# Patient Record
Sex: FEMALE | Race: WHITE | HISPANIC OR LATINO
[De-identification: names, ages, dates, MRNs, and addresses within clinical notes are randomized per-mention and may not be internally consistent; named-entity substitution may affect disease eponyms.]

---

## 2017-07-12 ENCOUNTER — HOSPITAL (OUTPATIENT)
Dept: OTHER | Age: 22
End: 2017-07-12
Attending: OBSTETRICS & GYNECOLOGY

## 2017-07-12 LAB
ANALYZER ANC (IANC): ABNORMAL
ERYTHROCYTE [DISTWIDTH] IN BLOOD: 13.7 % (ref 11–15)
HEMATOCRIT: 39.3 % (ref 36–46.5)
HGB BLD-MCNC: 12.9 GM/DL (ref 12–15.5)
MCH RBC QN AUTO: 27.6 PG (ref 26–34)
MCHC RBC AUTO-ENTMCNC: 32.8 GM/DL (ref 32–36.5)
MCV RBC AUTO: 84 FL (ref 78–100)
PLATELET # BLD: 254 THOUSAND/MCL (ref 140–450)
RBC # BLD: 4.68 MILLION/MCL (ref 4–5.2)
WBC # BLD: 13.1 THOUSAND/MCL (ref 4.2–11)

## 2017-07-14 ENCOUNTER — HOSPITAL (OUTPATIENT)
Dept: OTHER | Age: 22
End: 2017-07-14
Attending: OBSTETRICS & GYNECOLOGY

## 2017-07-14 ENCOUNTER — CHARTING TRANS (OUTPATIENT)
Dept: OTHER | Age: 22
End: 2017-07-14

## 2017-07-14 LAB
ANALYZER ANC (IANC): ABNORMAL
ANALYZER ANC (IANC): ABNORMAL
APTT PPP: 27 SECONDS (ref 22–30)
APTT PPP: 29 SECONDS (ref 22–30)
APTT PPP: NORMAL S
APTT PPP: NORMAL S
BASE DEFICIT BLDCOA-SCNC: 6 MMOL/L
BASE DEFICIT BLDCOV-SCNC: 5 MMOL/L
BASE EXCESS BLDCOA CALC-SCNC: ABNORMAL MMOL/L
BASE EXCESS-RC: NORMAL
BASOPHILS # BLD: 0 THOUSAND/MCL (ref 0–0.3)
BASOPHILS # BLD: 0 THOUSAND/MCL (ref 0–0.3)
BASOPHILS NFR BLD: 0 %
BASOPHILS NFR BLD: 0 %
DIFFERENTIAL METHOD BLD: ABNORMAL
DIFFERENTIAL METHOD BLD: ABNORMAL
EOSINOPHIL # BLD: 0 THOUSAND/MCL (ref 0.1–0.5)
EOSINOPHIL # BLD: 0.1 THOUSAND/MCL (ref 0.1–0.5)
EOSINOPHIL NFR BLD: 0 %
EOSINOPHIL NFR BLD: 1 %
ERYTHROCYTE [DISTWIDTH] IN BLOOD: 13.6 % (ref 11–15)
ERYTHROCYTE [DISTWIDTH] IN BLOOD: 13.8 % (ref 11–15)
FIBRINOGEN RESULT: 390 MG/DL (ref 190–425)
FIBRINOGEN RESULT: 488 MG/DL (ref 190–425)
HCO3 BLDCOA-SCNC: 26 MMOL/L (ref 21–28)
HCO3 BLDCOV-SCNC: 22 MMOL/L (ref 22–29)
HEMATOCRIT: 33.6 % (ref 36–46.5)
HEMATOCRIT: 35.5 % (ref 36–46.5)
HGB BLD-MCNC: 11.3 GM/DL (ref 12–15.5)
HGB BLD-MCNC: 12 GM/DL (ref 12–15.5)
INR PPP: 0.9
INR PPP: 0.9
LYMPHOCYTES # BLD: 1.5 THOUSAND/MCL (ref 1.2–5.2)
LYMPHOCYTES # BLD: 2.1 THOUSAND/MCL (ref 1.2–5.2)
LYMPHOCYTES NFR BLD: 11 %
LYMPHOCYTES NFR BLD: 7 %
MCH RBC QN AUTO: 27.7 PG (ref 26–34)
MCH RBC QN AUTO: 27.9 PG (ref 26–34)
MCHC RBC AUTO-ENTMCNC: 33.6 GM/DL (ref 32–36.5)
MCHC RBC AUTO-ENTMCNC: 33.8 GM/DL (ref 32–36.5)
MCV RBC AUTO: 82.4 FL (ref 78–100)
MCV RBC AUTO: 82.6 FL (ref 78–100)
MONOCYTES # BLD: 0.5 THOUSAND/MCL (ref 0.3–0.9)
MONOCYTES # BLD: 0.8 THOUSAND/MCL (ref 0.3–0.9)
MONOCYTES NFR BLD: 2 %
MONOCYTES NFR BLD: 4 %
NEUTROPHILS # BLD: 16.4 THOUSAND/MCL (ref 1.8–8)
NEUTROPHILS # BLD: 18.4 THOUSAND/MCL (ref 1.8–8)
NEUTROPHILS NFR BLD: 86 %
NEUTROPHILS NFR BLD: 89 %
NEUTS SEG NFR BLD: ABNORMAL %
NEUTS SEG NFR BLD: ABNORMAL %
PCO2 BLDCOA: 83 MM HG (ref 31–74)
PCO2 BLDCOV: 49 MM HG (ref 23–49)
PERCENT NRBC: ABNORMAL
PERCENT NRBC: ABNORMAL
PH BLDCOA: 7.1 UNIT (ref 7.18–7.38)
PH BLDCOV: 7.26 UNIT (ref 7.25–7.45)
PLATELET # BLD: 258 THOUSAND/MCL (ref 140–450)
PLATELET # BLD: 276 THOUSAND/MCL (ref 140–450)
PO2 BLDCOV: 21 MM HG (ref 17–41)
PO2 RC ARTERIAL CORD (RACPO): <20 MM HG (ref 6–31)
PROTHROMBIN TIME: 10.2 SECONDS (ref 9.7–11.8)
PROTHROMBIN TIME: 10.4 SECONDS (ref 9.7–11.8)
PROTHROMBIN TIME: NORMAL
PROTHROMBIN TIME: NORMAL
RBC # BLD: 4.08 MILLION/MCL (ref 4–5.2)
RBC # BLD: 4.3 MILLION/MCL (ref 4–5.2)
WBC # BLD: 19.1 THOUSAND/MCL (ref 4.2–11)
WBC # BLD: 20.7 THOUSAND/MCL (ref 4.2–11)

## 2017-07-15 LAB
ANALYZER ANC (IANC): ABNORMAL
ANALYZER ANC (IANC): ABNORMAL
BASOPHILS # BLD: 0 THOUSAND/MCL (ref 0–0.3)
BASOPHILS NFR BLD: 0 %
DIFFERENTIAL METHOD BLD: ABNORMAL
EOSINOPHIL # BLD: 0.1 THOUSAND/MCL (ref 0.1–0.5)
EOSINOPHIL NFR BLD: 1 %
ERYTHROCYTE [DISTWIDTH] IN BLOOD: 13.8 % (ref 11–15)
ERYTHROCYTE [DISTWIDTH] IN BLOOD: 13.9 % (ref 11–15)
HEMATOCRIT: 25.2 % (ref 36–46.5)
HEMATOCRIT: 27.1 % (ref 36–46.5)
HGB BLD-MCNC: 8.5 GM/DL (ref 12–15.5)
HGB BLD-MCNC: 8.8 GM/DL (ref 12–15.5)
LYMPHOCYTES # BLD: 1.6 THOUSAND/MCL (ref 1.2–5.2)
LYMPHOCYTES NFR BLD: 14 %
MCH RBC QN AUTO: 27.3 PG (ref 26–34)
MCH RBC QN AUTO: 28 PG (ref 26–34)
MCHC RBC AUTO-ENTMCNC: 32.5 GM/DL (ref 32–36.5)
MCHC RBC AUTO-ENTMCNC: 33.7 GM/DL (ref 32–36.5)
MCV RBC AUTO: 82.9 FL (ref 78–100)
MCV RBC AUTO: 84.2 FL (ref 78–100)
MONOCYTES # BLD: 0.6 THOUSAND/MCL (ref 0.3–0.9)
MONOCYTES NFR BLD: 5 %
NEUTROPHILS # BLD: 9.4 THOUSAND/MCL (ref 1.8–8)
NEUTROPHILS NFR BLD: 80 %
NEUTS SEG NFR BLD: ABNORMAL %
PERCENT NRBC: ABNORMAL
PLATELET # BLD: 196 THOUSAND/MCL (ref 140–450)
PLATELET # BLD: 248 THOUSAND/MCL (ref 140–450)
RBC # BLD: 3.04 MILLION/MCL (ref 4–5.2)
RBC # BLD: 3.22 MILLION/MCL (ref 4–5.2)
WBC # BLD: 11.6 THOUSAND/MCL (ref 4.2–11)
WBC # BLD: 13.1 THOUSAND/MCL (ref 4.2–11)

## 2017-07-17 ENCOUNTER — DIAGNOSTIC TRANS (OUTPATIENT)
Dept: OTHER | Age: 22
End: 2017-07-17

## 2019-12-10 ENCOUNTER — HOSPITAL (OUTPATIENT)
Dept: OTHER | Age: 24
End: 2019-12-10

## 2019-12-10 ENCOUNTER — DIAGNOSTIC TRANS (OUTPATIENT)
Dept: OTHER | Age: 24
End: 2019-12-10

## 2019-12-10 LAB
ANALYZER ANC (IANC): NORMAL
ANION GAP SERPL CALC-SCNC: 11 MMOL/L (ref 10–20)
BASOPHILS # BLD: 0.1 K/MCL (ref 0–0.3)
BASOPHILS NFR BLD: 1 %
BUN SERPL-MCNC: 9 MG/DL (ref 6–20)
BUN/CREAT SERPL: 14 (ref 7–25)
CALCIUM SERPL-MCNC: 9.2 MG/DL (ref 8.4–10.2)
CHLORIDE SERPL-SCNC: 104 MMOL/L (ref 98–107)
CO2 SERPL-SCNC: 28 MMOL/L (ref 21–32)
CREAT SERPL-MCNC: 0.66 MG/DL (ref 0.51–0.95)
DIFFERENTIAL METHOD BLD: NORMAL
EOSINOPHIL # BLD: 0.2 K/MCL (ref 0.1–0.5)
EOSINOPHIL NFR BLD: 2 %
ERYTHROCYTE [DISTWIDTH] IN BLOOD: 12.7 % (ref 11–15)
GLUCOSE SERPL-MCNC: 104 MG/DL (ref 65–99)
HCG POINT OF CARE (5HGRST): NEGATIVE
HCT VFR BLD CALC: 42.5 % (ref 36–46.5)
HGB BLD-MCNC: 14.2 G/DL (ref 12–15.5)
IMM GRANULOCYTES # BLD AUTO: 0.1 K/MCL (ref 0–0.2)
IMM GRANULOCYTES NFR BLD: 1 %
LYMPHOCYTES # BLD: 2.5 K/MCL (ref 1–4.8)
LYMPHOCYTES NFR BLD: 27 %
MAGNESIUM SERPL-MCNC: 2 MG/DL (ref 1.7–2.4)
MCH RBC QN AUTO: 28 PG (ref 26–34)
MCHC RBC AUTO-ENTMCNC: 33.4 G/DL (ref 32–36.5)
MCV RBC AUTO: 83.7 FL (ref 78–100)
MONOCYTES # BLD: 0.4 K/MCL (ref 0.3–0.9)
MONOCYTES NFR BLD: 4 %
NEUTROPHILS # BLD: 6 K/MCL (ref 1.8–7.7)
NEUTROPHILS NFR BLD: 65 %
NEUTS SEG NFR BLD: NORMAL %
NRBC (NRBCRE): 0 /100 WBC
NT-PROBNP SERPL-MCNC: 12 PG/ML
PLATELET # BLD: 344 K/MCL (ref 140–450)
POTASSIUM SERPL-SCNC: 4.2 MMOL/L (ref 3.4–5.1)
RBC # BLD: 5.08 MIL/MCL (ref 4–5.2)
SODIUM SERPL-SCNC: 139 MMOL/L (ref 135–145)
TROPONIN I SERPL HS-MCNC: <0.02 NG/ML
TROPONIN I SERPL HS-MCNC: <0.02 NG/ML
TSH SERPL-ACNC: 1.54 MCUNITS/ML (ref 0.35–5)
WBC # BLD: 9.2 K/MCL (ref 4.2–11)

## 2019-12-10 PROCEDURE — 99285 EMERGENCY DEPT VISIT HI MDM: CPT | Performed by: EMERGENCY MEDICINE

## 2019-12-10 PROCEDURE — 93010 ELECTROCARDIOGRAM REPORT: CPT | Performed by: INTERNAL MEDICINE

## 2020-03-11 ENCOUNTER — TELEPHONE (OUTPATIENT)
Dept: SCHEDULING | Age: 25
End: 2020-03-11

## 2024-11-04 ENCOUNTER — INITIAL PRENATAL (OUTPATIENT)
Age: 29
End: 2024-11-04
Payer: COMMERCIAL

## 2024-11-04 VITALS
BODY MASS INDEX: 40.77 KG/M2 | HEIGHT: 68 IN | DIASTOLIC BLOOD PRESSURE: 84 MMHG | SYSTOLIC BLOOD PRESSURE: 126 MMHG | WEIGHT: 269 LBS

## 2024-11-04 DIAGNOSIS — Z3A.01 6 WEEKS GESTATION OF PREGNANCY: Primary | ICD-10-CM

## 2024-11-04 DIAGNOSIS — O09.299 HISTORY OF GESTATIONAL DIABETES IN PRIOR PREGNANCY, CURRENTLY PREGNANT: ICD-10-CM

## 2024-11-04 DIAGNOSIS — O21.0 MORNING SICKNESS: ICD-10-CM

## 2024-11-04 DIAGNOSIS — O34.219 HISTORY OF CESAREAN DELIVERY AFFECTING PREGNANCY: ICD-10-CM

## 2024-11-04 DIAGNOSIS — Z86.32 HISTORY OF GESTATIONAL DIABETES IN PRIOR PREGNANCY, CURRENTLY PREGNANT: ICD-10-CM

## 2024-11-04 DIAGNOSIS — Z34.81 MULTIGRAVIDA IN FIRST TRIMESTER: ICD-10-CM

## 2024-11-04 PROCEDURE — 0501F PRENATAL FLOW SHEET: CPT

## 2024-11-04 RX ORDER — PRENATAL VIT NO.126/IRON/FOLIC 28MG-0.8MG
1 TABLET ORAL DAILY
COMMUNITY

## 2024-11-04 NOTE — PROGRESS NOTES
29-year old patient arrived to initiate prenatal care.     HPI: . No LMP recorded. Patient is pregnant.  Pregnancy was somewhat surprising but not prevented. Pre-pregnancy weight of 259.    Previous prenatal history significant for: 2 c-sections, GDM both pregnancies (not diagnosed until after the birth with first baby), macrosomia. First  for breech, second  for oligohydramnios while in labor   History significant for: healthy    The following portion of the patient's history were reviewed and updated as needed: allergies, current medications, past family history, past medical history, social history, surgical history, and problem list.    ROS: All systems reviewed and are negative with exception of the following: amenorrhea, nausea, fatigue      US ordered today, reviewed and shows IUP of 6w3d gestation and Estimated Date of Delivery: 25    Pap Smear : wnl    Exam:  Wt: 269 lb for TWG of 4.536 kg (10 lb), B/P 126/84, FHTs 133   General Appearance:  healthy-appearing . Appropriate mood and behavior.  HEENT:  Neck supple, no thyroidmegaly.  Cardiorespiratory: HR str and reg. No murmur. Lungs clear. Resp even and unlabored.  Abd: Soft, nontender. No CVA tenderness.   Ext: Calves non-tender. No cyanosis or edema.    Diagnoses and all orders for this visit:    1. 6 weeks gestation of pregnancy (Primary)  -     ABO / Rh  -     Ambulatory Referral to [facility]/Perinatology  -     Antibody Screen  -     CBC & Differential  -     Chlamydia trachomatis, Neisseria gonorrhoeae, PCR w/ confirmation - Urine, Urine, Clean Catch  -     ToxASSURE Select 13 (MW) - Urine, Clean Catch  -     HCV Antibody Rfx To Qnt PCR  -     Varicella Zoster Antibody, IgG  -     Urine Culture - , Urine, Clean Catch  -     Rubella Antibody, IgG  -     RPR, Rfx Qn RPR / Confirm TP  -     HIV-1 / O / 2 Ag / Antibody  -     Hepatitis B Surface Antigen    2. Multigravida in first trimester  Reviewed information in new  OB packet, including OTC medications for use during pregnancy, first trimester of pregnancy and discomforts, regular OB routine, ffDNA/chromosomal risk and maternal carrier screening testing.  Advised to maintain regular activity and/or exercise. Discussed bleeding and pelvic pain warnings and other signs to report. Discussed and ordered initial prenatal labs today. First Trimester of Pregnancy video included in AVS.    3. History of  delivery affecting pregnancy  Desires a     4. History of gestational diabetes in prior pregnancy, currently pregnant  -     Hemoglobin A1c    5. Morning sickness  Discussed nausea relief measures both nonpharmacologic (small frequent meals or snacks, avoiding triggers, aromatherapy, ginger, hard and/or sour candies or Preggie Pops, ginger, accu-pressure wrist bands, citrus/citric acid) and pharmacologic (both OTC and Rx). Encouraged adequate hydration and intake of calories. Patient to notify provider if symptoms persist or worsen Morning Sickness and Doxylamine; Vitamin B6 Delayed- and Extended-Release Tablets education included in the AVS.            Return to the office in 4 weeks for routine follow up and as needed with concerns.    This note has been signed electronically.   Sydney OLIVARES

## 2024-11-12 LAB
ABO GROUP BLD: NORMAL
BACTERIA UR CULT: NORMAL
BACTERIA UR CULT: NORMAL
BASOPHILS # BLD AUTO: 0.1 X10E3/UL (ref 0–0.2)
BASOPHILS NFR BLD AUTO: 1 %
BLD GP AB SCN SERPL QL: NEGATIVE
C TRACH RRNA SPEC QL NAA+PROBE: NEGATIVE
DRUGS UR: NORMAL
EOSINOPHIL # BLD AUTO: 0.2 X10E3/UL (ref 0–0.4)
EOSINOPHIL NFR BLD AUTO: 2 %
ERYTHROCYTE [DISTWIDTH] IN BLOOD BY AUTOMATED COUNT: 12.9 % (ref 11.7–15.4)
HBA1C MFR BLD: 6 % (ref 4.8–5.6)
HBV SURFACE AG SERPL QL IA: NEGATIVE
HCT VFR BLD AUTO: 43.7 % (ref 34–46.6)
HCV AB SERPL QL IA: NORMAL
HCV IGG SERPL QL IA: NON REACTIVE
HGB BLD-MCNC: 13.8 G/DL (ref 11.1–15.9)
HIV 1+2 AB+HIV1 P24 AG SERPL QL IA: NON REACTIVE
IMM GRANULOCYTES # BLD AUTO: 0.1 X10E3/UL (ref 0–0.1)
IMM GRANULOCYTES NFR BLD AUTO: 1 %
LYMPHOCYTES # BLD AUTO: 2.8 X10E3/UL (ref 0.7–3.1)
LYMPHOCYTES NFR BLD AUTO: 28 %
MCH RBC QN AUTO: 27.8 PG (ref 26.6–33)
MCHC RBC AUTO-ENTMCNC: 31.6 G/DL (ref 31.5–35.7)
MCV RBC AUTO: 88 FL (ref 79–97)
MONOCYTES # BLD AUTO: 0.5 X10E3/UL (ref 0.1–0.9)
MONOCYTES NFR BLD AUTO: 5 %
N GONORRHOEA RRNA SPEC QL NAA+PROBE: NEGATIVE
NEUTROPHILS # BLD AUTO: 6.6 X10E3/UL (ref 1.4–7)
NEUTROPHILS NFR BLD AUTO: 63 %
PLATELET # BLD AUTO: 305 X10E3/UL (ref 150–450)
RBC # BLD AUTO: 4.96 X10E6/UL (ref 3.77–5.28)
RH BLD: POSITIVE
RPR SER QL: NON REACTIVE
RUBV IGG SERPL IA-ACNC: 1.61 INDEX
VZV IGG SER QL IA: REACTIVE
WBC # BLD AUTO: 10.3 X10E3/UL (ref 3.4–10.8)

## 2024-12-02 ENCOUNTER — ROUTINE PRENATAL (OUTPATIENT)
Age: 29
End: 2024-12-02
Payer: COMMERCIAL

## 2024-12-02 VITALS — BODY MASS INDEX: 41.05 KG/M2 | DIASTOLIC BLOOD PRESSURE: 84 MMHG | SYSTOLIC BLOOD PRESSURE: 148 MMHG | WEIGHT: 270 LBS

## 2024-12-02 DIAGNOSIS — O09.299 HISTORY OF GESTATIONAL DIABETES IN PRIOR PREGNANCY, CURRENTLY PREGNANT: ICD-10-CM

## 2024-12-02 DIAGNOSIS — Z86.32 HISTORY OF GESTATIONAL DIABETES IN PRIOR PREGNANCY, CURRENTLY PREGNANT: ICD-10-CM

## 2024-12-02 DIAGNOSIS — O34.219 PREVIOUS CESAREAN DELIVERY, ANTEPARTUM: Primary | ICD-10-CM

## 2024-12-02 PROCEDURE — 0502F SUBSEQUENT PRENATAL CARE: CPT | Performed by: OBSTETRICS & GYNECOLOGY

## 2024-12-02 NOTE — PROGRESS NOTES
Feeling well, nausea beginning to improve  Reviewed dating ultrasound  Reviewed normal prenatal labs  Discussed ffDNA and maternal carrier screening, declines for now  Declines flu vaccine    Diagnoses and all orders for this visit:    1. Previous  delivery, antepartum (Primary)    2. History of gestational diabetes in prior pregnancy, currently pregnant

## 2024-12-30 ENCOUNTER — ROUTINE PRENATAL (OUTPATIENT)
Age: 29
End: 2024-12-30
Payer: COMMERCIAL

## 2024-12-30 VITALS — WEIGHT: 276 LBS | BODY MASS INDEX: 41.97 KG/M2 | SYSTOLIC BLOOD PRESSURE: 124 MMHG | DIASTOLIC BLOOD PRESSURE: 92 MMHG

## 2024-12-30 DIAGNOSIS — O09.299 HISTORY OF GESTATIONAL DIABETES IN PRIOR PREGNANCY, CURRENTLY PREGNANT: Primary | ICD-10-CM

## 2024-12-30 DIAGNOSIS — O34.219 PREVIOUS CESAREAN DELIVERY, ANTEPARTUM: Primary | ICD-10-CM

## 2024-12-30 DIAGNOSIS — O09.299 HISTORY OF GESTATIONAL DIABETES IN PRIOR PREGNANCY, CURRENTLY PREGNANT: ICD-10-CM

## 2024-12-30 DIAGNOSIS — Z86.32 HISTORY OF GESTATIONAL DIABETES IN PRIOR PREGNANCY, CURRENTLY PREGNANT: ICD-10-CM

## 2024-12-30 DIAGNOSIS — Z86.32 HISTORY OF GESTATIONAL DIABETES IN PRIOR PREGNANCY, CURRENTLY PREGNANT: Primary | ICD-10-CM

## 2024-12-30 NOTE — PROGRESS NOTES
Feeling well, no nausea  Reviewed normal prenatal labs  Early GTT today due to history  Gender peek next visit    Diagnoses and all orders for this visit:    1. Previous  delivery, antepartum (Primary)    2. History of gestational diabetes in prior pregnancy, currently pregnant

## 2024-12-31 LAB — GLUCOSE 1H P 50 G GLC PO SERPL-MCNC: 150 MG/DL (ref 70–139)

## 2025-02-03 ENCOUNTER — ROUTINE PRENATAL (OUTPATIENT)
Age: 30
End: 2025-02-03
Payer: COMMERCIAL

## 2025-02-03 VITALS — SYSTOLIC BLOOD PRESSURE: 114 MMHG | BODY MASS INDEX: 42.57 KG/M2 | DIASTOLIC BLOOD PRESSURE: 72 MMHG | WEIGHT: 280 LBS

## 2025-02-03 DIAGNOSIS — O09.299 HISTORY OF GESTATIONAL DIABETES IN PRIOR PREGNANCY, CURRENTLY PREGNANT: ICD-10-CM

## 2025-02-03 DIAGNOSIS — Z3A.19 19 WEEKS GESTATION OF PREGNANCY: ICD-10-CM

## 2025-02-03 DIAGNOSIS — O34.219 PREVIOUS CESAREAN DELIVERY, ANTEPARTUM: Primary | ICD-10-CM

## 2025-02-03 DIAGNOSIS — Z86.32 HISTORY OF GESTATIONAL DIABETES IN PRIOR PREGNANCY, CURRENTLY PREGNANT: ICD-10-CM

## 2025-02-03 NOTE — PROGRESS NOTES
Starting to feel fetal movement  Feeling well  Reviewed FSBS log, fasting elevated around 110 but postprandial normal  Has MFM appointment in 3 days  Discussed starting 81mg ASA    Diagnoses and all orders for this visit:    1. Previous  delivery, antepartum (Primary)    2. History of gestational diabetes in prior pregnancy, currently pregnant    3. 19 weeks gestation of pregnancy

## 2025-03-31 ENCOUNTER — ROUTINE PRENATAL (OUTPATIENT)
Age: 30
End: 2025-03-31
Payer: COMMERCIAL

## 2025-03-31 VITALS — SYSTOLIC BLOOD PRESSURE: 110 MMHG | BODY MASS INDEX: 44 KG/M2 | WEIGHT: 289.4 LBS | DIASTOLIC BLOOD PRESSURE: 78 MMHG

## 2025-03-31 DIAGNOSIS — O24.414 INSULIN CONTROLLED GESTATIONAL DIABETES MELLITUS (GDM) IN THIRD TRIMESTER: ICD-10-CM

## 2025-03-31 DIAGNOSIS — Z3A.27 27 WEEKS GESTATION OF PREGNANCY: ICD-10-CM

## 2025-03-31 DIAGNOSIS — O34.219 PREVIOUS CESAREAN DELIVERY, ANTEPARTUM: Primary | ICD-10-CM

## 2025-03-31 PROCEDURE — 0502F SUBSEQUENT PRENATAL CARE: CPT | Performed by: OBSTETRICS & GYNECOLOGY

## 2025-03-31 RX ORDER — INSULIN GLARGINE-YFGN 100 [IU]/ML
25 INJECTION, SOLUTION SUBCUTANEOUS DAILY
COMMUNITY
Start: 2025-03-19

## 2025-03-31 RX ORDER — FLURBIPROFEN SODIUM 0.3 MG/ML
SOLUTION/ DROPS OPHTHALMIC
COMMUNITY
Start: 2025-02-06

## 2025-03-31 NOTE — PROGRESS NOTES
Good fetal movement  Reviewed normal anatomy ultrasound  HgbA1c and Hgb today, Rh positive  Discuss Tdap next visit  Discussed [place] My[place] contractions  Has MFM appointment next week ()    Diagnoses and all orders for this visit:    1. Previous  delivery, antepartum (Primary)    2. Insulin controlled gestational diabetes mellitus (GDM) in third trimester  -     Hemoglobin A1c    3. 27 weeks gestation of pregnancy  -     Hemoglobin  -     RPR Qualitative with Reflex to Quant

## 2025-04-01 LAB
HBA1C MFR BLD: 6 % (ref 4.8–5.6)
HGB BLD-MCNC: 13.1 G/DL (ref 11.1–15.9)
RPR SER QL: NON REACTIVE

## 2025-04-17 ENCOUNTER — ROUTINE PRENATAL (OUTPATIENT)
Age: 30
End: 2025-04-17
Payer: COMMERCIAL

## 2025-04-17 VITALS — BODY MASS INDEX: 44.47 KG/M2 | WEIGHT: 292.5 LBS | SYSTOLIC BLOOD PRESSURE: 128 MMHG | DIASTOLIC BLOOD PRESSURE: 80 MMHG

## 2025-04-17 DIAGNOSIS — Z3A.29 29 WEEKS GESTATION OF PREGNANCY: ICD-10-CM

## 2025-04-17 DIAGNOSIS — O24.414 INSULIN CONTROLLED GESTATIONAL DIABETES MELLITUS (GDM) IN THIRD TRIMESTER: Primary | ICD-10-CM

## 2025-04-17 DIAGNOSIS — Z34.83 MULTIGRAVIDA IN THIRD TRIMESTER: ICD-10-CM

## 2025-04-17 LAB
GLUCOSE UR STRIP-MCNC: NEGATIVE MG/DL
PROT UR STRIP-MCNC: ABNORMAL MG/DL

## 2025-04-17 RX ORDER — SYRINGE AND NEEDLE,INSULIN,1ML 29 G X1/2"
SYRINGE, EMPTY DISPOSABLE MISCELLANEOUS
COMMUNITY
Start: 2025-04-07

## 2025-04-17 RX ORDER — INSULIN GLARGINE 100 [IU]/ML
42 INJECTION, SOLUTION SUBCUTANEOUS DAILY
COMMUNITY
Start: 2025-04-14 | End: 2025-04-28 | Stop reason: DRUGHIGH

## 2025-04-17 NOTE — PROGRESS NOTES
Reason for visit: Routine OB visit at 29w6d    History of Present Illness  The patient is a 29-year-old female who presents for a routine prenatal visit.    Reports that her baby is active and in good health. Difficulties with fasting blood glucose levels are noted, which have remained in the 100s despite an increase in nightly insulin dosage to 42 units. Postprandial glucose levels are within the normal range. Continues to take prenatal vitamins and reports no instances of cramping or [place] Myrick contractions. Plans to breastfeed and has procured a breast pump through insurance. Children are under the care of Dr. Sauceda in [place], [place].       ROS: All systems reviewed and are negative with exception(s) noted above      /80   Wt 133 kg (292 lb 8 oz)   BMI 44.47 kg/m²   Total weight gain: 15.2 kg (33 lb 8 oz)  Total weight gain expected 5 kg (11 lb)-9 kg (19 lb)    Prenatal Assessment  Fetal Heart Rate: 141  Movement: Present    Exam:  General Appearance:  No visualized signs of distress. Normal mood and behavior.  Cardiorespiratory: HR str and reg. Lungs clear. Breathing even and unlabored.  Abdomen: soft and nontender. No CVA tenderness. Gravid uterus.  Extremeties: no edema. Calves non-tender.         Impression  Diagnoses and all orders for this visit:    1. Insulin controlled gestational diabetes mellitus (GDM) in third trimester (Primary)    2. 29 weeks gestation of pregnancy  -     POC Urinalysis Dipstick    3. Multigravida in third trimester       Assessment & Plan  1. Routine prenatal visit.  Her hemoglobin levels, last assessed at 27 weeks, indicate no signs of anemia as she progresses into the third trimester. The postpartum depression scale was utilized during pregnancy to monitor potential risks for  depression, yielding a score of zero, which is within the normal range. She has been advised to receive the Tdap immunization, which can be administered at any point from now  onwards. This will provide her with enhanced immunity against tetanus, diphtheria, and pertussis, and subsequently confer passive immunity to the infant during the initial months until the baby is eligible for its first immunization. Upon reaching the 32-week gestational milestone, she will commence bi-weekly visits with maternal fetal medicine and our office. Each visit will include an ultrasound for a biophysical profile. Dr. Sauceda' office will continue to conduct regular growth ultrasounds. She has been instructed to seek medical attention at the hospital's labor and delivery unit on the second floor if she experiences symptoms indicative of  labor such as suspected fluid leakage, vaginal bleeding, or regular or painful contractions. For non-urgent queries, she can utilize [employer] messages, while urgent concerns should be directed to our office, even after hours, where an on-call provider will respond.    - Administer Tdap immunization  - Bi-weekly visits with maternal fetal medicine and our office starting at 32 weeks  - Ultrasound and biophysical profile at each visit  - Regular growth ultrasounds by Dr. Sauceda' office  - Immediate medical attention for symptoms of  labor  - Utilize [employer] for non-urgent queries  - Contact office for urgent concerns    2. Gestational diabetes mellitus.  She reports struggling with fasting blood sugars, which remain in the 100s despite an increase in her nighttime insulin dose to 42 units. Postprandial blood sugars are normal. She is advised to continue monitoring her blood sugar levels and to follow up with her healthcare provider if there are any concerns or if fasting blood sugars do not improve.    - Continue monitoring blood sugar levels  - Follow up with healthcare provider if concerns arise or fasting blood sugars do not improve    Follow-up  Follow up in 2 weeks.      Refer to the AVS instructions for additional education provided.         This note has  been signed electronically.    Saba Ledezma DNP, APRBRIANDA, CNM    Patient or patient representative verbalized consent for the use of Ambient Listening during the visit with  ELISE Holbrook for chart documentation. 4/17/2025  14:32 CDT

## 2025-04-28 ENCOUNTER — ROUTINE PRENATAL (OUTPATIENT)
Age: 30
End: 2025-04-28
Payer: COMMERCIAL

## 2025-04-28 VITALS — DIASTOLIC BLOOD PRESSURE: 82 MMHG | WEIGHT: 291 LBS | BODY MASS INDEX: 44.25 KG/M2 | SYSTOLIC BLOOD PRESSURE: 118 MMHG

## 2025-04-28 DIAGNOSIS — Z3A.31 31 WEEKS GESTATION OF PREGNANCY: ICD-10-CM

## 2025-04-28 DIAGNOSIS — O24.414 INSULIN CONTROLLED GESTATIONAL DIABETES MELLITUS (GDM) IN THIRD TRIMESTER: Primary | ICD-10-CM

## 2025-04-28 DIAGNOSIS — O34.219 PREVIOUS CESAREAN DELIVERY, ANTEPARTUM: ICD-10-CM

## 2025-04-28 PROCEDURE — 0502F SUBSEQUENT PRENATAL CARE: CPT | Performed by: OBSTETRICS & GYNECOLOGY

## 2025-04-28 RX ORDER — INSULIN GLARGINE 100 [IU]/ML
50 INJECTION, SOLUTION SUBCUTANEOUS DAILY
COMMUNITY
Start: 2025-04-23

## 2025-04-28 NOTE — PROGRESS NOTES
Good fetal movement  Schedule repeat  and BTL   Tubal papers today  Has MFM appointment in 1 week  To begin twice weekly fetal testing next week  Declines Tdap   labor precautions    Diagnoses and all orders for this visit:    1. Insulin controlled gestational diabetes mellitus (GDM) in third trimester (Primary)    2. Previous  delivery, antepartum  -     Case Request    3. 31 weeks gestation of pregnancy

## 2025-05-08 ENCOUNTER — ROUTINE PRENATAL (OUTPATIENT)
Age: 30
End: 2025-05-08
Payer: COMMERCIAL

## 2025-05-08 VITALS — BODY MASS INDEX: 44.4 KG/M2 | SYSTOLIC BLOOD PRESSURE: 122 MMHG | WEIGHT: 292 LBS | DIASTOLIC BLOOD PRESSURE: 82 MMHG

## 2025-05-08 DIAGNOSIS — Z3A.32 32 WEEKS GESTATION OF PREGNANCY: Primary | ICD-10-CM

## 2025-05-08 DIAGNOSIS — O24.414 INSULIN CONTROLLED GESTATIONAL DIABETES MELLITUS (GDM) IN THIRD TRIMESTER: ICD-10-CM

## 2025-05-08 DIAGNOSIS — O34.219 PREVIOUS CESAREAN DELIVERY, ANTEPARTUM: ICD-10-CM

## 2025-05-08 RX ORDER — INSULIN GLARGINE 100 [IU]/ML
65 INJECTION, SOLUTION SUBCUTANEOUS DAILY
COMMUNITY
Start: 2025-05-07

## 2025-05-08 NOTE — PROGRESS NOTES
Good fetal movement  No contractions, no reflux  US today BPP 8/8, MADDI 15.6cm, vertex   labor precautions    Diagnoses and all orders for this visit:    1. 32 weeks gestation of pregnancy (Primary)    2. Insulin controlled gestational diabetes mellitus (GDM) in third trimester    3. Previous  delivery, antepartum

## 2025-05-15 ENCOUNTER — ROUTINE PRENATAL (OUTPATIENT)
Age: 30
End: 2025-05-15
Payer: COMMERCIAL

## 2025-05-15 VITALS — SYSTOLIC BLOOD PRESSURE: 126 MMHG | WEIGHT: 293 LBS | BODY MASS INDEX: 44.9 KG/M2 | DIASTOLIC BLOOD PRESSURE: 78 MMHG

## 2025-05-15 DIAGNOSIS — Z3A.33 33 WEEKS GESTATION OF PREGNANCY: ICD-10-CM

## 2025-05-15 DIAGNOSIS — O24.414 INSULIN CONTROLLED GESTATIONAL DIABETES MELLITUS (GDM) IN THIRD TRIMESTER: Primary | ICD-10-CM

## 2025-05-15 DIAGNOSIS — O09.293 HISTORY OF MACROSOMIA IN INFANT IN PRIOR PREGNANCY, CURRENTLY PREGNANT IN THIRD TRIMESTER: ICD-10-CM

## 2025-05-15 DIAGNOSIS — O34.219 PREVIOUS CESAREAN DELIVERY, ANTEPARTUM: ICD-10-CM

## 2025-05-15 DIAGNOSIS — Z34.83 PRENATAL CARE, SUBSEQUENT PREGNANCY, THIRD TRIMESTER: ICD-10-CM

## 2025-05-15 LAB
GLUCOSE UR STRIP-MCNC: NEGATIVE MG/DL
PROT UR STRIP-MCNC: ABNORMAL MG/DL

## 2025-05-22 ENCOUNTER — ROUTINE PRENATAL (OUTPATIENT)
Age: 30
End: 2025-05-22
Payer: COMMERCIAL

## 2025-05-22 VITALS — SYSTOLIC BLOOD PRESSURE: 128 MMHG | BODY MASS INDEX: 45.61 KG/M2 | WEIGHT: 293 LBS | DIASTOLIC BLOOD PRESSURE: 84 MMHG

## 2025-05-22 DIAGNOSIS — Z3A.34 34 WEEKS GESTATION OF PREGNANCY: Primary | ICD-10-CM

## 2025-05-22 DIAGNOSIS — O24.414 INSULIN CONTROLLED GESTATIONAL DIABETES MELLITUS (GDM) IN THIRD TRIMESTER: ICD-10-CM

## 2025-05-22 DIAGNOSIS — O34.219 PREVIOUS CESAREAN DELIVERY, ANTEPARTUM: ICD-10-CM

## 2025-05-22 NOTE — PROGRESS NOTES
Good fetal movement  Labor precautions  Reviewed recent MFM US  US today BPP , MADDI 15.8cm, vertex  Surg pack next visit    Diagnoses and all orders for this visit:    1. 34 weeks gestation of pregnancy (Primary)    2. Insulin controlled gestational diabetes mellitus (GDM) in third trimester    3. Previous  delivery, antepartum

## 2025-05-26 PROBLEM — O24.414 INSULIN CONTROLLED GESTATIONAL DIABETES MELLITUS (GDM) IN THIRD TRIMESTER: Status: ACTIVE | Noted: 2025-05-26

## 2025-05-26 PROBLEM — O09.293 HISTORY OF MACROSOMIA IN INFANT IN PRIOR PREGNANCY, CURRENTLY PREGNANT IN THIRD TRIMESTER: Status: ACTIVE | Noted: 2025-05-26

## 2025-05-27 ENCOUNTER — HOSPITAL ENCOUNTER (OUTPATIENT)
Facility: HOSPITAL | Age: 30
Discharge: HOME | End: 2025-05-27
Attending: OBSTETRICS & GYNECOLOGY | Admitting: OBSTETRICS & GYNECOLOGY
Payer: COMMERCIAL

## 2025-05-27 VITALS
DIASTOLIC BLOOD PRESSURE: 54 MMHG | OXYGEN SATURATION: 98 % | SYSTOLIC BLOOD PRESSURE: 119 MMHG | HEART RATE: 88 BPM | RESPIRATION RATE: 15 BRPM | TEMPERATURE: 98.3 F

## 2025-05-27 PROCEDURE — 59025 FETAL NON-STRESS TEST: CPT

## 2025-05-27 PROCEDURE — G0463 HOSPITAL OUTPT CLINIC VISIT: HCPCS

## 2025-05-27 PROCEDURE — G0378 HOSPITAL OBSERVATION PER HR: HCPCS

## 2025-05-27 NOTE — NURSING NOTE
Education provided to patient on reasons to return to labor and delivery including increased frequency and intensity of contractions, sudden gush/leaking of fluid, vaginal bleeding, and decreased fetal movement.   Cecilia Fontenot RN  09:44 CDT

## 2025-05-27 NOTE — NURSING NOTE
GUY Valentin, a  at 35w4d with an YARITZA of 2025, by Ultrasound, was seen at [facility] LABOR DELIVERY for a nonstress test.    Chief Complaint   Patient presents with    Non-stress Test     Sent from [facility] office for BPP  for movement.          Patient Active Problem List   Diagnosis    Previous  delivery, antepartum    History of macrosomia in infant in prior pregnancy, currently pregnant in third trimester    Insulin controlled gestational diabetes mellitus (GDM) in third trimester       Start Time: 920  Stop Time: 940    Interpretation A  Nonstress Test Interpretation A: Reactive (25 : Cecilia Fontenot, RN)  Comments A: Reactive nst verified by Rosie VIEIRA RN and Francesca BONE RN (25 : Cecilia Fontenot, RN)

## 2025-05-27 NOTE — PROGRESS NOTES
Patient Seen in: [facility] AND [facility] Emergency Department    History   Patient presents with:  Seizure Disorder (neurologic)    Stated Complaint: Seizure    HPI    Patient is a 27-year-old female who states that she had a seizure this evening.   Patient has Reason for visit: Routine OB visit at 33w6d    History of Present Illness  The patient is a 29-year-old female who presents for a routine prenatal visit at 33 weeks gestation.    She reports satisfactory fetal movement and overall well-being. A recent growth scan indicated that the fetus is in the 98th percentile, which has raised concerns about potential blood sugar complications due to the large size of the baby. She is scheduled for a  on 2025, following two previous C-sections. She reports no pain, cramping, or contractions. However, she has observed significant swelling over the past week. She does not currently use a maternity support belt.     Her blood glucose levels have been well-managed, with fasting levels consistently around 100. Despite attempts to lower these levels through dietary modifications and evening walks, the readings remain variable. Her postprandial readings are within the normal range. She continues to take her prenatal vitamins.       ROS: All systems reviewed and are negative with exception as noted above.       /78   Wt 134 kg (295 lb 4.8 oz)   BMI 44.90 kg/m²   Total weight gain: 16.5 kg (36 lb 4.8 oz)  Total weight gain expected 5 kg (11 lb)-9 kg (19 lb)    Prenatal Assessment  Fetal Heart Rate: 145  Movement: Present  Presentation: Vertex        Exam:  General Appearance:  No visualized signs of distress. Normal mood and behavior.  Cardiorespiratory: HR str and reg. Lungs clear. Breathing even and unlabored.  Abdomen: soft and nontender. No CVA tenderness. Gravid uterus.  Extremeties: no edema. Calves non-tender.        Postpartum Depression: Low Risk  (2025)    [place]  Depression Scale     Last EPDS Total Score: 0     Last EPDS Self Harm Result: Never     33-week today: BPP 8/8 - reassuring, MADDI 11.5 cm, DVP 7.0 cm, vertex, anterior placenta    Impression  Diagnoses and all orders for this visit:    1. Insulin controlled gestational diabetes  Skin: Skin is warm and dry. No rash noted. Nursing note and vitals reviewed.             ED Course     Labs Reviewed   BASIC METABOLIC PANEL (8) - Normal   LEVETIRACETAM, S                MDM     Patient given 1 extra oral dose of Keppra in the emergenc mellitus (GDM) in third trimester (Primary)    2. 33 weeks gestation of pregnancy  -     POC Urinalysis Dipstick    3. Prenatal care, subsequent pregnancy, third trimester    4. Previous  delivery, antepartum          Assessment & Plan  1. Prenatal visit at 33 weeks gestation.  - Blood pressure: 126/78  - Biophysical profile today:   - Amniotic fluid index: 11.5, largest pocket: 7 cm  - Continue current regimen for managing blood glucose levels  - Recommend maternity support belt for discomfort during ambulation and additional uterine support  - Suggest kinesiology tape below the uterus for added support  - Provided information on labor signs, [place] Myrick contractions, and  preparation  - Seek immediate medical attention at labor and delivery unit if amniotic fluid leakage, vaginal bleeding, regular or painful contractions, or concerns about fetal movement occur    2. Gestational diabetes.  - Fasting blood sugar levels around 100 despite insulin adjustments and lifestyle modifications  - Current insulin dosage: 65 units  - Continue monitoring blood sugar levels and maintain current insulin regimen  - Coordinate with MFM for management of diabetes and then planning for delivery  - Consider delivery at 37 weeks if blood sugar levels are not well-managed    Follow-up in 1 week with Dr. Parra with P ultrasound.     Refer to the AVS instructions for additional education provided.         This note has been signed electronically.    Saba Ledezma DNP, APRN, CNM    Patient or patient representative verbalized consent for the use of Ambient Listening during the visit with  ELISE Holbrook for chart documentation. 5/15/2025  14:29 CDT

## 2025-05-27 NOTE — NURSING NOTE
Patient presents to LDR from [facility] for NST for BPP 6/8.   Cecilia Fontenot RN

## 2025-05-28 PROBLEM — Z34.90 PREGNANCY: Status: ACTIVE | Noted: 2025-05-28

## 2025-05-29 ENCOUNTER — ROUTINE PRENATAL (OUTPATIENT)
Age: 30
End: 2025-05-29
Payer: COMMERCIAL

## 2025-05-29 ENCOUNTER — TELEPHONE (OUTPATIENT)
Age: 30
End: 2025-05-29
Payer: COMMERCIAL

## 2025-05-29 VITALS — DIASTOLIC BLOOD PRESSURE: 88 MMHG | WEIGHT: 293 LBS | SYSTOLIC BLOOD PRESSURE: 118 MMHG | BODY MASS INDEX: 45.46 KG/M2

## 2025-05-29 DIAGNOSIS — Z3A.35 35 WEEKS GESTATION OF PREGNANCY: Primary | ICD-10-CM

## 2025-05-29 DIAGNOSIS — O34.219 PREVIOUS CESAREAN DELIVERY, ANTEPARTUM: ICD-10-CM

## 2025-05-29 DIAGNOSIS — O24.414 INSULIN CONTROLLED GESTATIONAL DIABETES MELLITUS (GDM) IN THIRD TRIMESTER: ICD-10-CM

## 2025-05-29 RX ORDER — ONDANSETRON 4 MG/1
4 TABLET, ORALLY DISINTEGRATING ORAL EVERY 6 HOURS PRN
OUTPATIENT
Start: 2025-05-29

## 2025-05-29 RX ORDER — SODIUM CHLORIDE, SODIUM LACTATE, POTASSIUM CHLORIDE, CALCIUM CHLORIDE 600; 310; 30; 20 MG/100ML; MG/100ML; MG/100ML; MG/100ML
125 INJECTION, SOLUTION INTRAVENOUS CONTINUOUS
OUTPATIENT
Start: 2025-05-29 | End: 2025-05-30

## 2025-05-29 RX ORDER — HYDROMORPHONE HYDROCHLORIDE 1 MG/ML
0.5 INJECTION, SOLUTION INTRAMUSCULAR; INTRAVENOUS; SUBCUTANEOUS
Refills: 0 | OUTPATIENT
Start: 2025-05-29 | End: 2025-06-08

## 2025-05-29 RX ORDER — KETOROLAC TROMETHAMINE 15 MG/ML
30 INJECTION, SOLUTION INTRAMUSCULAR; INTRAVENOUS ONCE
OUTPATIENT
Start: 2025-05-29 | End: 2025-05-29

## 2025-05-29 RX ORDER — OXYTOCIN/0.9 % SODIUM CHLORIDE 30/500 ML
250 PLASTIC BAG, INJECTION (ML) INTRAVENOUS CONTINUOUS
OUTPATIENT
Start: 2025-05-29 | End: 2025-05-29

## 2025-05-29 RX ORDER — SODIUM CHLORIDE 0.9 % (FLUSH) 0.9 %
10 SYRINGE (ML) INJECTION AS NEEDED
OUTPATIENT
Start: 2025-05-29

## 2025-05-29 RX ORDER — CARBOPROST TROMETHAMINE 250 UG/ML
250 INJECTION, SOLUTION INTRAMUSCULAR AS NEEDED
OUTPATIENT
Start: 2025-05-29

## 2025-05-29 RX ORDER — SODIUM CHLORIDE 0.9 % (FLUSH) 0.9 %
10 SYRINGE (ML) INJECTION EVERY 12 HOURS SCHEDULED
OUTPATIENT
Start: 2025-05-29

## 2025-05-29 RX ORDER — INSULIN GLARGINE 100 [IU]/ML
80 INJECTION, SOLUTION SUBCUTANEOUS DAILY
COMMUNITY
Start: 2025-05-27

## 2025-05-29 RX ORDER — OXYTOCIN/0.9 % SODIUM CHLORIDE 30/500 ML
999 PLASTIC BAG, INJECTION (ML) INTRAVENOUS ONCE
OUTPATIENT
Start: 2025-05-29 | End: 2025-05-29

## 2025-05-29 RX ORDER — SODIUM CHLORIDE 9 MG/ML
40 INJECTION, SOLUTION INTRAVENOUS AS NEEDED
OUTPATIENT
Start: 2025-05-29

## 2025-05-29 RX ORDER — FAMOTIDINE 10 MG/ML
20 INJECTION, SOLUTION INTRAVENOUS ONCE AS NEEDED
OUTPATIENT
Start: 2025-05-29

## 2025-05-29 RX ORDER — FAMOTIDINE 10 MG
20 TABLET ORAL ONCE AS NEEDED
OUTPATIENT
Start: 2025-05-29

## 2025-05-29 RX ORDER — ACETAMINOPHEN 500 MG
1000 TABLET ORAL ONCE
OUTPATIENT
Start: 2025-05-29 | End: 2025-05-29

## 2025-05-29 RX ORDER — ONDANSETRON 2 MG/ML
4 INJECTION INTRAMUSCULAR; INTRAVENOUS EVERY 6 HOURS PRN
OUTPATIENT
Start: 2025-05-29

## 2025-05-29 RX ORDER — METHYLERGONOVINE MALEATE 0.2 MG/ML
200 INJECTION INTRAVENOUS ONCE AS NEEDED
OUTPATIENT
Start: 2025-05-29

## 2025-05-29 RX ORDER — MISOPROSTOL 100 UG/1
800 TABLET ORAL ONCE AS NEEDED
OUTPATIENT
Start: 2025-05-29

## 2025-05-29 NOTE — PROGRESS NOTES
Good fetal movement  Has had a few contractions  US today BPP 8/8, MADDI 13.9cm, vertex  Surg pack done  Labor instructions    Diagnoses and all orders for this visit:    1. 35 weeks gestation of pregnancy (Primary)    2. Insulin controlled gestational diabetes mellitus (GDM) in third trimester    3. Previous  delivery, antepartum

## 2025-05-29 NOTE — TELEPHONE ENCOUNTER
Pt is under management of Dr. Sauceda but will f/u with Dr. Parra to see if there is something specific he wants me to review with the pt.

## 2025-05-29 NOTE — H&P (VIEW-ONLY)
"[facility]  GUY Valentin  : 1995  MRN: [id number]  CSN: [id number]    History and Physical    Subjective   GUY Valentin is a 29 y.o. year old  with an Estimated Date of Delivery: 25 scheduled on  for  delivery due to previous  section X 2, not a  candidate.  She is planning for sterilization at the time of the .    Prenatal care has been with Dr. Liang Parra.  It has been complicated by pre-gestational diabetes on Lantus 80 units .    OB History    Para Term  AB Living   4 2 2 0 1 2   SAB IAB Ectopic Molar Multiple Live Births   1 0 0 0 0 2      # Outcome Date GA Lbr Alfonso/2nd Weight Sex Type Anes PTL Lv   4 Current            3 Term 21 39w0d  3742 g (8 lb 4 oz) M CS-Unspec   LISA   2 Term 17 37w0d  4564 g (10 lb 1 oz) F CS-Unspec   LISA   1 SAB 2016 5w0d            No past medical history on file.  Past Surgical History:   Procedure Laterality Date     SECTION         Current Outpatient Medications:     B-D UF III MINI PEN NEEDLES 31G X 5 MM misc, INJECT 1 EACH INTO THE SKIN EVERY EVENING., Disp: , Rfl:     Lantus SoloStar 100 UNIT/ML injection pen, Inject 80 Units under the skin into the appropriate area as directed Daily., Disp: , Rfl:     prenatal vitamin (prenatal, CLASSIC, vitamin) tablet, Take 1 tablet by mouth Daily., Disp: , Rfl:     RELION GLUCOSE TEST STRIPS test strip, 1 each by Other route 4 (Four) Times a Day. use 1 strip to check glucose 4 times daily, Disp: , Rfl:     SURE COMFORT INS SYR 1CC/29G 29G X 1/2\" 1 ML misc, , Disp: , Rfl:   No family history on file.    No Known Allergies  Social History    Tobacco Use      Smoking status: Never      Smokeless tobacco: Never    Review of Systems      Objective   /88   Wt 136 kg (299 lb)   BMI 45.46 kg/m²   General: well developed; well nourished  no acute distress   Heart: regular rate and rhythm   Lungs: breathing is unlabored   Abdomen: soft, " non-tender; no masses     Cervix:   presenting part vertex  Prenatal Labs  Lab Results   Component Value Date    HGB 13.1 2025    HEPBSAG Negative 2024    ABO O 2024    RH Positive 2024    ABSCRN Negative 2024    HOW4YTG5 Non Reactive 2024    URINECX Final report 2024       Recent Labs  Lab Results   Component Value Date    HGB 13.1 2025    HCT 43.7 2024    WBC 10.3 2024     2024           Assessment   IUP with an Estimated Date of Delivery: 25  Planned  section on  for previous  section X 2, not a  candidate.  Gestational diabetes on insulin     Plan   Repeat  with bilateral tubal ligation    Risks, benefits, and alternatives to  section were discussed with the patient at  length.  The surgical nature of  section was discussed.  The indications for  section were discussed.  Risks of bleeding, infection, and damage to surrounding organs were reviewed.  Injury to blood vessels, the urinary bladder, the ureter, and the intestines were all reviewed.  Management of these complications were reviewed.    Risks, benefits, and alternatives of permanent sterilization were discussed with the patient in detail. Intraoperative risks of bleeding and damage to surrounding organs, including but not limited to intestine, bladder and ureter, were explained. Management of these were also explained. Postoperative complications such as infection, pneumonia, DVT, and bleeding were explained. The importance of compliance with postoperative restrictions was discussed.  Success and failure rates were discussed. Increased risk of ectopic pregnancy was explained. In addition, reversible forms of contraception were reviewed such as the pill, the patch, the shot, and the IUD.     All of the patient's questions were answered to her satisfaction. She was encouraged to return for an additional appointment if she  had further questions. She verbalized understanding of the above and wished to proceed with the outlined plan.      Raffaele Parra MD  5/29/2025

## 2025-05-29 NOTE — TELEPHONE ENCOUNTER
Please contact patient to provide additional diabetic education. Pt is currently on Lantus 80u and reports elevated fasting glucoses despite diet management and increase in lantus.

## 2025-05-29 NOTE — H&P
"[facility]  GUY Valentin  : 1995  MRN: [id number]  CSN: [id number]    History and Physical    Subjective   GUY Valentin is a 29 y.o. year old  with an Estimated Date of Delivery: 25 scheduled on  for  delivery due to previous  section X 2, not a  candidate.  She is planning for sterilization at the time of the .    Prenatal care has been with Dr. Liang Parra.  It has been complicated by pre-gestational diabetes on Lantus 80 units .    OB History    Para Term  AB Living   4 2 2 0 1 2   SAB IAB Ectopic Molar Multiple Live Births   1 0 0 0 0 2      # Outcome Date GA Lbr Alfonso/2nd Weight Sex Type Anes PTL Lv   4 Current            3 Term 21 39w0d  3742 g (8 lb 4 oz) M CS-Unspec   LISA   2 Term 17 37w0d  4564 g (10 lb 1 oz) F CS-Unspec   LISA   1 SAB 2016 5w0d            No past medical history on file.  Past Surgical History:   Procedure Laterality Date     SECTION         Current Outpatient Medications:     B-D UF III MINI PEN NEEDLES 31G X 5 MM misc, INJECT 1 EACH INTO THE SKIN EVERY EVENING., Disp: , Rfl:     Lantus SoloStar 100 UNIT/ML injection pen, Inject 80 Units under the skin into the appropriate area as directed Daily., Disp: , Rfl:     prenatal vitamin (prenatal, CLASSIC, vitamin) tablet, Take 1 tablet by mouth Daily., Disp: , Rfl:     RELION GLUCOSE TEST STRIPS test strip, 1 each by Other route 4 (Four) Times a Day. use 1 strip to check glucose 4 times daily, Disp: , Rfl:     SURE COMFORT INS SYR 1CC/29G 29G X 1/2\" 1 ML misc, , Disp: , Rfl:   No family history on file.    No Known Allergies  Social History    Tobacco Use      Smoking status: Never      Smokeless tobacco: Never    Review of Systems      Objective   /88   Wt 136 kg (299 lb)   BMI 45.46 kg/m²   General: well developed; well nourished  no acute distress   Heart: regular rate and rhythm   Lungs: breathing is unlabored   Abdomen: soft, " non-tender; no masses     Cervix:   presenting part vertex  Prenatal Labs  Lab Results   Component Value Date    HGB 13.1 2025    HEPBSAG Negative 2024    ABO O 2024    RH Positive 2024    ABSCRN Negative 2024    HFW6HNL9 Non Reactive 2024    URINECX Final report 2024       Recent Labs  Lab Results   Component Value Date    HGB 13.1 2025    HCT 43.7 2024    WBC 10.3 2024     2024           Assessment   IUP with an Estimated Date of Delivery: 25  Planned  section on  for previous  section X 2, not a  candidate.  Gestational diabetes on insulin     Plan   Repeat  with bilateral tubal ligation    Risks, benefits, and alternatives to  section were discussed with the patient at  length.  The surgical nature of  section was discussed.  The indications for  section were discussed.  Risks of bleeding, infection, and damage to surrounding organs were reviewed.  Injury to blood vessels, the urinary bladder, the ureter, and the intestines were all reviewed.  Management of these complications were reviewed.    Risks, benefits, and alternatives of permanent sterilization were discussed with the patient in detail. Intraoperative risks of bleeding and damage to surrounding organs, including but not limited to intestine, bladder and ureter, were explained. Management of these were also explained. Postoperative complications such as infection, pneumonia, DVT, and bleeding were explained. The importance of compliance with postoperative restrictions was discussed.  Success and failure rates were discussed. Increased risk of ectopic pregnancy was explained. In addition, reversible forms of contraception were reviewed such as the pill, the patch, the shot, and the IUD.     All of the patient's questions were answered to her satisfaction. She was encouraged to return for an additional appointment if she  had further questions. She verbalized understanding of the above and wished to proceed with the outlined plan.      Raffaele Parra MD  5/29/2025

## 2025-05-30 NOTE — TELEPHONE ENCOUNTER
This wasn't under the direction of Dr Parra, it was only because the patient was open to more educational opportunities and I offered your services.

## 2025-05-30 NOTE — TELEPHONE ENCOUNTER
Spoke with Dr. Parra and he said there was no need to call as she is followed by [facility].  Please let me know if there's anything else she needs.

## 2025-06-11 ENCOUNTER — ANESTHESIA EVENT (OUTPATIENT)
Dept: LABOR AND DELIVERY | Facility: HOSPITAL | Age: 30
End: 2025-06-11
Payer: COMMERCIAL

## 2025-06-12 ENCOUNTER — ROUTINE PRENATAL (OUTPATIENT)
Age: 30
End: 2025-06-12
Payer: COMMERCIAL

## 2025-06-12 VITALS — DIASTOLIC BLOOD PRESSURE: 92 MMHG | SYSTOLIC BLOOD PRESSURE: 130 MMHG | BODY MASS INDEX: 46.38 KG/M2 | WEIGHT: 293 LBS

## 2025-06-12 DIAGNOSIS — Z3A.37 37 WEEKS GESTATION OF PREGNANCY: Primary | ICD-10-CM

## 2025-06-12 DIAGNOSIS — O34.219 PREVIOUS CESAREAN DELIVERY, ANTEPARTUM: ICD-10-CM

## 2025-06-12 DIAGNOSIS — O24.414 INSULIN CONTROLLED GESTATIONAL DIABETES MELLITUS (GDM) IN THIRD TRIMESTER: ICD-10-CM

## 2025-06-12 NOTE — PROGRESS NOTES
Good fetal movement  No contractions  US today BPP 8/8, MADDI 15.3 cm  Has MFM appointment in 4 days  Labor instructions  Plan  and BTL in 1 week    Diagnoses and all orders for this visit:    1. 37 weeks gestation of pregnancy (Primary)    2. Insulin controlled gestational diabetes mellitus (GDM) in third trimester    3. Previous  delivery, antepartum

## 2025-06-16 ENCOUNTER — TELEPHONE (OUTPATIENT)
Age: 30
End: 2025-06-16
Payer: COMMERCIAL

## 2025-06-16 NOTE — TELEPHONE ENCOUNTER
Spoke with patient by phone. Pt informed that due to a death in the family, Dr Parra will not be here Friday 6/20/25 to perform her Rpt C/S with TL. Per Dr Parra, pt has been given the option to choose another doctor in the practice to perform the surgery 6/20/25 or move to Monday 6/23/25 with Dr Parra. Pt elects to move surgery to 6/23/25. My chart message sent to patient informing her of new surgery date and time.

## 2025-06-16 NOTE — TELEPHONE ENCOUNTER
Attempted to reach pt by phone but no answer. LM to return call directly to me so I can discuss her upcoming C/S on 6/20/25.

## 2025-06-17 ENCOUNTER — TELEPHONE (OUTPATIENT)
Age: 30
End: 2025-06-17
Payer: COMMERCIAL

## 2025-06-17 NOTE — TELEPHONE ENCOUNTER
Spoke with pt by phone again and informed her that Dr Parra will now be available for her Rpt C/S with TL on 6/20/25 at 743a. Pt voiced understanding. Spoke with Aditi in surgery scheduling.

## 2025-06-20 ENCOUNTER — HOSPITAL ENCOUNTER (INPATIENT)
Facility: HOSPITAL | Age: 30
LOS: 3 days | Discharge: HOME | End: 2025-06-23
Attending: OBSTETRICS & GYNECOLOGY | Admitting: OBSTETRICS & GYNECOLOGY
Payer: COMMERCIAL

## 2025-06-20 ENCOUNTER — ANESTHESIA (OUTPATIENT)
Dept: LABOR AND DELIVERY | Facility: HOSPITAL | Age: 30
End: 2025-06-20
Payer: COMMERCIAL

## 2025-06-20 DIAGNOSIS — O34.219 PREVIOUS CESAREAN DELIVERY, ANTEPARTUM: ICD-10-CM

## 2025-06-20 DIAGNOSIS — O24.414 INSULIN CONTROLLED GESTATIONAL DIABETES MELLITUS (GDM) IN THIRD TRIMESTER: Primary | ICD-10-CM

## 2025-06-20 PROBLEM — Z34.90 PREGNANCY: Status: RESOLVED | Noted: 2025-05-28 | Resolved: 2025-06-20

## 2025-06-20 PROBLEM — Z98.891 PREVIOUS CESAREAN SECTION: Status: ACTIVE | Noted: 2025-06-20

## 2025-06-20 LAB
ABO GROUP BLD: NORMAL
BLD GP AB SCN SERPL QL: NEGATIVE
DEPRECATED RDW RBC AUTO: 42.5 FL (ref 37–54)
DEPRECATED RDW RBC AUTO: 43.3 FL (ref 37–54)
ERYTHROCYTE [DISTWIDTH] IN BLOOD BY AUTOMATED COUNT: 14.1 % (ref 12.3–15.4)
ERYTHROCYTE [DISTWIDTH] IN BLOOD BY AUTOMATED COUNT: 14.3 % (ref 12.3–15.4)
FIBRINOGEN PPP-MCNC: 513 MG/DL (ref 240–460)
GLUCOSE BLDC GLUCOMTR-MCNC: 88 MG/DL (ref 70–130)
GLUCOSE SERPL-MCNC: 95 MG/DL (ref 65–99)
HCT VFR BLD AUTO: 35.4 % (ref 34–46.6)
HCT VFR BLD AUTO: 36.9 % (ref 34–46.6)
HGB BLD-MCNC: 11.9 G/DL (ref 12–15.9)
HGB BLD-MCNC: 12.4 G/DL (ref 12–15.9)
MCH RBC QN AUTO: 28 PG (ref 26.6–33)
MCH RBC QN AUTO: 28.2 PG (ref 26.6–33)
MCHC RBC AUTO-ENTMCNC: 33.6 G/DL (ref 31.5–35.7)
MCHC RBC AUTO-ENTMCNC: 33.6 G/DL (ref 31.5–35.7)
MCV RBC AUTO: 83.3 FL (ref 79–97)
MCV RBC AUTO: 83.9 FL (ref 79–97)
PLATELET # BLD AUTO: 182 10*3/MM3 (ref 140–450)
PLATELET # BLD AUTO: 199 10*3/MM3 (ref 140–450)
PMV BLD AUTO: 11.4 FL (ref 6–12)
PMV BLD AUTO: 11.4 FL (ref 6–12)
RBC # BLD AUTO: 4.22 10*6/MM3 (ref 3.77–5.28)
RBC # BLD AUTO: 4.43 10*6/MM3 (ref 3.77–5.28)
RH BLD: POSITIVE
T&S EXPIRATION DATE: NORMAL
TREPONEMA PALLIDUM IGG+IGM AB [PRESENCE] IN SERUM OR PLASMA BY IMMUNOASSAY: NORMAL
WBC NRBC COR # BLD AUTO: 9.59 10*3/MM3 (ref 3.4–10.8)
WBC NRBC COR # BLD AUTO: 9.77 10*3/MM3 (ref 3.4–10.8)

## 2025-06-20 PROCEDURE — 86780 TREPONEMA PALLIDUM: CPT | Performed by: OBSTETRICS & GYNECOLOGY

## 2025-06-20 PROCEDURE — 59510 CESAREAN DELIVERY: CPT | Performed by: OBSTETRICS & GYNECOLOGY

## 2025-06-20 PROCEDURE — 25010000002 METOCLOPRAMIDE PER 10 MG

## 2025-06-20 PROCEDURE — 86900 BLOOD TYPING SEROLOGIC ABO: CPT | Performed by: OBSTETRICS & GYNECOLOGY

## 2025-06-20 PROCEDURE — 0UN90ZZ RELEASE UTERUS, OPEN APPROACH: ICD-10-PCS | Performed by: OBSTETRICS & GYNECOLOGY

## 2025-06-20 PROCEDURE — 63710000001 INSULIN GLARGINE PER 5 UNITS: Performed by: OBSTETRICS & GYNECOLOGY

## 2025-06-20 PROCEDURE — 25010000002 CEFAZOLIN PER 500 MG

## 2025-06-20 PROCEDURE — 86850 RBC ANTIBODY SCREEN: CPT | Performed by: OBSTETRICS & GYNECOLOGY

## 2025-06-20 PROCEDURE — 25810000003 LACTATED RINGERS PER 1000 ML: Performed by: OBSTETRICS & GYNECOLOGY

## 2025-06-20 PROCEDURE — 25010000002 KETOROLAC TROMETHAMINE PER 15 MG

## 2025-06-20 PROCEDURE — 25010000002 FAMOTIDINE 10 MG/ML SOLUTION

## 2025-06-20 PROCEDURE — 25010000002 METHYLERGONOVINE MALEATE PER 0.2 MG

## 2025-06-20 PROCEDURE — 25010000002 OXYTOCIN PER 10 UNITS

## 2025-06-20 PROCEDURE — 88302 TISSUE EXAM BY PATHOLOGIST: CPT | Performed by: OBSTETRICS & GYNECOLOGY

## 2025-06-20 PROCEDURE — 85384 FIBRINOGEN ACTIVITY: CPT | Performed by: OBSTETRICS & GYNECOLOGY

## 2025-06-20 PROCEDURE — 25010000002 BUPIVACAINE PF 0.75 % SOLUTION

## 2025-06-20 PROCEDURE — 85027 COMPLETE CBC AUTOMATED: CPT | Performed by: OBSTETRICS & GYNECOLOGY

## 2025-06-20 PROCEDURE — 25010000002 ONDANSETRON PER 1 MG

## 2025-06-20 PROCEDURE — 86901 BLOOD TYPING SEROLOGIC RH(D): CPT | Performed by: OBSTETRICS & GYNECOLOGY

## 2025-06-20 PROCEDURE — 0UB70ZZ EXCISION OF BILATERAL FALLOPIAN TUBES, OPEN APPROACH: ICD-10-PCS | Performed by: OBSTETRICS & GYNECOLOGY

## 2025-06-20 PROCEDURE — 0W3R7ZZ CONTROL BLEEDING IN GENITOURINARY TRACT, VIA NATURAL OR ARTIFICIAL OPENING: ICD-10-PCS | Performed by: OBSTETRICS & GYNECOLOGY

## 2025-06-20 PROCEDURE — 25010000002 CEFAZOLIN PER 500 MG: Performed by: OBSTETRICS & GYNECOLOGY

## 2025-06-20 PROCEDURE — 25010000002 CEFOXITIN PER 1 G: Performed by: OBSTETRICS & GYNECOLOGY

## 2025-06-20 PROCEDURE — 25010000002 KETOROLAC TROMETHAMINE PER 15 MG: Performed by: OBSTETRICS & GYNECOLOGY

## 2025-06-20 PROCEDURE — 82947 ASSAY GLUCOSE BLOOD QUANT: CPT | Performed by: OBSTETRICS & GYNECOLOGY

## 2025-06-20 PROCEDURE — 25010000002 HYDROMORPHONE 1 MG/ML SOLUTION

## 2025-06-20 PROCEDURE — 82948 REAGENT STRIP/BLOOD GLUCOSE: CPT

## 2025-06-20 DEVICE — HEMOST ABS SURGICEL PWDR 3GM: Type: IMPLANTABLE DEVICE | Site: UTERUS | Status: FUNCTIONAL

## 2025-06-20 RX ORDER — SODIUM CHLORIDE, SODIUM LACTATE, POTASSIUM CHLORIDE, CALCIUM CHLORIDE 600; 310; 30; 20 MG/100ML; MG/100ML; MG/100ML; MG/100ML
125 INJECTION, SOLUTION INTRAVENOUS CONTINUOUS
Status: DISCONTINUED | OUTPATIENT
Start: 2025-06-20 | End: 2025-06-20

## 2025-06-20 RX ORDER — METHYLERGONOVINE MALEATE 0.2 MG/ML
200 INJECTION INTRAVENOUS ONCE AS NEEDED
Status: DISCONTINUED | OUTPATIENT
Start: 2025-06-20 | End: 2025-06-20 | Stop reason: HOSPADM

## 2025-06-20 RX ORDER — KETOROLAC TROMETHAMINE 30 MG/ML
INJECTION, SOLUTION INTRAMUSCULAR; INTRAVENOUS AS NEEDED
Status: DISCONTINUED | OUTPATIENT
Start: 2025-06-20 | End: 2025-06-20 | Stop reason: SURG

## 2025-06-20 RX ORDER — ONDANSETRON 2 MG/ML
4 INJECTION INTRAMUSCULAR; INTRAVENOUS EVERY 6 HOURS PRN
Status: DISCONTINUED | OUTPATIENT
Start: 2025-06-20 | End: 2025-06-20 | Stop reason: HOSPADM

## 2025-06-20 RX ORDER — IBUPROFEN 600 MG/1
600 TABLET, FILM COATED ORAL EVERY 6 HOURS
Status: DISCONTINUED | OUTPATIENT
Start: 2025-06-21 | End: 2025-06-23 | Stop reason: HOSPADM

## 2025-06-20 RX ORDER — OXYTOCIN/0.9 % SODIUM CHLORIDE 30/500 ML
125 PLASTIC BAG, INJECTION (ML) INTRAVENOUS ONCE AS NEEDED
Status: DISCONTINUED | OUTPATIENT
Start: 2025-06-20 | End: 2025-06-23 | Stop reason: HOSPADM

## 2025-06-20 RX ORDER — ONDANSETRON 4 MG/1
4 TABLET, ORALLY DISINTEGRATING ORAL EVERY 8 HOURS PRN
Status: DISCONTINUED | OUTPATIENT
Start: 2025-06-20 | End: 2025-06-23 | Stop reason: HOSPADM

## 2025-06-20 RX ORDER — SIMETHICONE 80 MG
80 TABLET,CHEWABLE ORAL 4 TIMES DAILY PRN
Status: DISCONTINUED | OUTPATIENT
Start: 2025-06-20 | End: 2025-06-23 | Stop reason: HOSPADM

## 2025-06-20 RX ORDER — BUPIVACAINE HCL/0.9 % NACL/PF 0.125 %
PLASTIC BAG, INJECTION (ML) EPIDURAL AS NEEDED
Status: DISCONTINUED | OUTPATIENT
Start: 2025-06-20 | End: 2025-06-20 | Stop reason: SURG

## 2025-06-20 RX ORDER — ONDANSETRON 2 MG/ML
4 INJECTION INTRAMUSCULAR; INTRAVENOUS EVERY 6 HOURS PRN
Status: DISCONTINUED | OUTPATIENT
Start: 2025-06-20 | End: 2025-06-23 | Stop reason: SDUPTHER

## 2025-06-20 RX ORDER — ACETAMINOPHEN 500 MG
1000 TABLET ORAL EVERY 6 HOURS
Status: COMPLETED | OUTPATIENT
Start: 2025-06-20 | End: 2025-06-21

## 2025-06-20 RX ORDER — METHYLERGONOVINE MALEATE 0.2 MG/ML
INJECTION INTRAVENOUS
Status: COMPLETED
Start: 2025-06-20 | End: 2025-06-20

## 2025-06-20 RX ORDER — OXYTOCIN 10 [USP'U]/ML
INJECTION, SOLUTION INTRAMUSCULAR; INTRAVENOUS AS NEEDED
Status: DISCONTINUED | OUTPATIENT
Start: 2025-06-20 | End: 2025-06-20 | Stop reason: SURG

## 2025-06-20 RX ORDER — OXYTOCIN/0.9 % SODIUM CHLORIDE 30/500 ML
250 PLASTIC BAG, INJECTION (ML) INTRAVENOUS CONTINUOUS
Status: ACTIVE | OUTPATIENT
Start: 2025-06-20 | End: 2025-06-20

## 2025-06-20 RX ORDER — CEFAZOLIN SODIUM 1 G/3ML
INJECTION, POWDER, FOR SOLUTION INTRAMUSCULAR; INTRAVENOUS AS NEEDED
Status: DISCONTINUED | OUTPATIENT
Start: 2025-06-20 | End: 2025-06-20 | Stop reason: SURG

## 2025-06-20 RX ORDER — OXYTOCIN/0.9 % SODIUM CHLORIDE 30/500 ML
999 PLASTIC BAG, INJECTION (ML) INTRAVENOUS ONCE
Status: DISCONTINUED | OUTPATIENT
Start: 2025-06-20 | End: 2025-06-23 | Stop reason: HOSPADM

## 2025-06-20 RX ORDER — PRENATAL VIT/IRON FUM/FOLIC AC 27MG-0.8MG
1 TABLET ORAL DAILY
Status: DISCONTINUED | OUTPATIENT
Start: 2025-06-20 | End: 2025-06-23 | Stop reason: HOSPADM

## 2025-06-20 RX ORDER — TRANEXAMIC ACID 10 MG/ML
1000 INJECTION, SOLUTION INTRAVENOUS ONCE
Status: COMPLETED | OUTPATIENT
Start: 2025-06-20 | End: 2025-06-20

## 2025-06-20 RX ORDER — CARBOPROST TROMETHAMINE 250 UG/ML
250 INJECTION, SOLUTION INTRAMUSCULAR ONCE AS NEEDED
Status: DISCONTINUED | OUTPATIENT
Start: 2025-06-20 | End: 2025-06-23 | Stop reason: HOSPADM

## 2025-06-20 RX ORDER — FAMOTIDINE 10 MG/ML
20 INJECTION, SOLUTION INTRAVENOUS ONCE
Status: COMPLETED | OUTPATIENT
Start: 2025-06-20 | End: 2025-06-20

## 2025-06-20 RX ORDER — CARBOPROST TROMETHAMINE 250 UG/ML
250 INJECTION, SOLUTION INTRAMUSCULAR AS NEEDED
Status: DISCONTINUED | OUTPATIENT
Start: 2025-06-20 | End: 2025-06-20 | Stop reason: HOSPADM

## 2025-06-20 RX ORDER — SODIUM CHLORIDE 0.9 % (FLUSH) 0.9 %
10 SYRINGE (ML) INJECTION EVERY 12 HOURS SCHEDULED
Status: DISCONTINUED | OUTPATIENT
Start: 2025-06-20 | End: 2025-06-20 | Stop reason: HOSPADM

## 2025-06-20 RX ORDER — FAMOTIDINE 20 MG/1
20 TABLET, FILM COATED ORAL ONCE AS NEEDED
Status: DISCONTINUED | OUTPATIENT
Start: 2025-06-20 | End: 2025-06-20 | Stop reason: HOSPADM

## 2025-06-20 RX ORDER — HYDROMORPHONE HYDROCHLORIDE 1 MG/ML
0.5 INJECTION, SOLUTION INTRAMUSCULAR; INTRAVENOUS; SUBCUTANEOUS
Status: DISCONTINUED | OUTPATIENT
Start: 2025-06-20 | End: 2025-06-20 | Stop reason: HOSPADM

## 2025-06-20 RX ORDER — METOCLOPRAMIDE HYDROCHLORIDE 5 MG/ML
10 INJECTION INTRAMUSCULAR; INTRAVENOUS EVERY 4 HOURS PRN
Status: ACTIVE | OUTPATIENT
Start: 2025-06-20 | End: 2025-06-21

## 2025-06-20 RX ORDER — NALOXONE HCL 0.4 MG/ML
0.1 VIAL (ML) INJECTION
Status: DISCONTINUED | OUTPATIENT
Start: 2025-06-20 | End: 2025-06-23

## 2025-06-20 RX ORDER — DOCUSATE SODIUM 100 MG/1
100 CAPSULE, LIQUID FILLED ORAL 2 TIMES DAILY PRN
Status: DISCONTINUED | OUTPATIENT
Start: 2025-06-20 | End: 2025-06-23 | Stop reason: HOSPADM

## 2025-06-20 RX ORDER — KETOROLAC TROMETHAMINE 15 MG/ML
15 INJECTION, SOLUTION INTRAMUSCULAR; INTRAVENOUS EVERY 6 HOURS
Status: COMPLETED | OUTPATIENT
Start: 2025-06-20 | End: 2025-06-21

## 2025-06-20 RX ORDER — METOCLOPRAMIDE HYDROCHLORIDE 5 MG/ML
10 INJECTION INTRAMUSCULAR; INTRAVENOUS ONCE
Status: COMPLETED | OUTPATIENT
Start: 2025-06-20 | End: 2025-06-20

## 2025-06-20 RX ORDER — BUPIVACAINE HYDROCHLORIDE 7.5 MG/ML
INJECTION, SOLUTION EPIDURAL; RETROBULBAR
Status: COMPLETED | OUTPATIENT
Start: 2025-06-20 | End: 2025-06-20

## 2025-06-20 RX ORDER — ENOXAPARIN SODIUM 100 MG/ML
40 INJECTION SUBCUTANEOUS EVERY 12 HOURS
Status: DISCONTINUED | OUTPATIENT
Start: 2025-06-21 | End: 2025-06-23 | Stop reason: HOSPADM

## 2025-06-20 RX ORDER — MISOPROSTOL 200 UG/1
800 TABLET ORAL ONCE AS NEEDED
Status: COMPLETED | OUTPATIENT
Start: 2025-06-20 | End: 2025-06-20

## 2025-06-20 RX ORDER — OXYCODONE HYDROCHLORIDE 5 MG/1
5 TABLET ORAL EVERY 4 HOURS PRN
Status: DISCONTINUED | OUTPATIENT
Start: 2025-06-20 | End: 2025-06-23 | Stop reason: HOSPADM

## 2025-06-20 RX ORDER — FAMOTIDINE 10 MG/ML
20 INJECTION, SOLUTION INTRAVENOUS ONCE AS NEEDED
Status: DISCONTINUED | OUTPATIENT
Start: 2025-06-20 | End: 2025-06-20 | Stop reason: HOSPADM

## 2025-06-20 RX ORDER — ONDANSETRON 2 MG/ML
INJECTION INTRAMUSCULAR; INTRAVENOUS AS NEEDED
Status: DISCONTINUED | OUTPATIENT
Start: 2025-06-20 | End: 2025-06-20 | Stop reason: SURG

## 2025-06-20 RX ORDER — ACETAMINOPHEN 325 MG/1
650 TABLET ORAL EVERY 6 HOURS
Status: DISCONTINUED | OUTPATIENT
Start: 2025-06-21 | End: 2025-06-23 | Stop reason: HOSPADM

## 2025-06-20 RX ORDER — HYDROMORPHONE HYDROCHLORIDE 1 MG/ML
0.5 INJECTION, SOLUTION INTRAMUSCULAR; INTRAVENOUS; SUBCUTANEOUS
Status: DISCONTINUED | OUTPATIENT
Start: 2025-06-20 | End: 2025-06-23

## 2025-06-20 RX ORDER — ONDANSETRON 4 MG/1
4 TABLET, ORALLY DISINTEGRATING ORAL EVERY 6 HOURS PRN
Status: DISCONTINUED | OUTPATIENT
Start: 2025-06-20 | End: 2025-06-20 | Stop reason: HOSPADM

## 2025-06-20 RX ORDER — SODIUM CHLORIDE 9 MG/ML
40 INJECTION, SOLUTION INTRAVENOUS AS NEEDED
Status: DISCONTINUED | OUTPATIENT
Start: 2025-06-20 | End: 2025-06-20 | Stop reason: HOSPADM

## 2025-06-20 RX ORDER — KETOROLAC TROMETHAMINE 30 MG/ML
30 INJECTION, SOLUTION INTRAMUSCULAR; INTRAVENOUS ONCE
Status: DISCONTINUED | OUTPATIENT
Start: 2025-06-20 | End: 2025-06-20 | Stop reason: HOSPADM

## 2025-06-20 RX ORDER — NALBUPHINE HYDROCHLORIDE 10 MG/ML
2.5 INJECTION INTRAMUSCULAR; INTRAVENOUS; SUBCUTANEOUS EVERY 4 HOURS PRN
Status: DISCONTINUED | OUTPATIENT
Start: 2025-06-20 | End: 2025-06-20 | Stop reason: RX

## 2025-06-20 RX ORDER — SODIUM CHLORIDE 0.9 % (FLUSH) 0.9 %
10 SYRINGE (ML) INJECTION AS NEEDED
Status: DISCONTINUED | OUTPATIENT
Start: 2025-06-20 | End: 2025-06-20 | Stop reason: HOSPADM

## 2025-06-20 RX ORDER — CITRIC ACID/SODIUM CITRATE 334-500MG
30 SOLUTION, ORAL ORAL ONCE
Status: COMPLETED | OUTPATIENT
Start: 2025-06-20 | End: 2025-06-20

## 2025-06-20 RX ORDER — OXYCODONE HYDROCHLORIDE 5 MG/1
10 TABLET ORAL EVERY 4 HOURS PRN
Status: DISCONTINUED | OUTPATIENT
Start: 2025-06-20 | End: 2025-06-23 | Stop reason: HOSPADM

## 2025-06-20 RX ORDER — METHYLERGONOVINE MALEATE 0.2 MG/ML
200 INJECTION INTRAVENOUS AS NEEDED
Status: DISCONTINUED | OUTPATIENT
Start: 2025-06-20 | End: 2025-06-23 | Stop reason: HOSPADM

## 2025-06-20 RX ORDER — ONDANSETRON 2 MG/ML
4 INJECTION INTRAMUSCULAR; INTRAVENOUS EVERY 6 HOURS PRN
Status: DISCONTINUED | OUTPATIENT
Start: 2025-06-20 | End: 2025-06-23 | Stop reason: HOSPADM

## 2025-06-20 RX ORDER — ACETAMINOPHEN 500 MG
1000 TABLET ORAL ONCE
Status: COMPLETED | OUTPATIENT
Start: 2025-06-20 | End: 2025-06-20

## 2025-06-20 RX ORDER — MISOPROSTOL 200 UG/1
800 TABLET ORAL ONCE AS NEEDED
Status: DISCONTINUED | OUTPATIENT
Start: 2025-06-20 | End: 2025-06-23 | Stop reason: HOSPADM

## 2025-06-20 RX ORDER — NALOXONE HCL 0.4 MG/ML
0.4 VIAL (ML) INJECTION
Status: ACTIVE | OUTPATIENT
Start: 2025-06-20 | End: 2025-06-21

## 2025-06-20 RX ADMIN — ACETAMINOPHEN 1000 MG: 500 TABLET, FILM COATED ORAL at 21:04

## 2025-06-20 RX ADMIN — CEFAZOLIN 2 G: 2 INJECTION, POWDER, FOR SOLUTION INTRAMUSCULAR; INTRAVENOUS at 07:30

## 2025-06-20 RX ADMIN — INSULIN GLARGINE 20 UNITS: 100 INJECTION, SOLUTION SUBCUTANEOUS at 21:20

## 2025-06-20 RX ADMIN — SODIUM CHLORIDE, POTASSIUM CHLORIDE, SODIUM LACTATE AND CALCIUM CHLORIDE 125 ML/HR: 600; 310; 30; 20 INJECTION, SOLUTION INTRAVENOUS at 05:58

## 2025-06-20 RX ADMIN — KETOROLAC TROMETHAMINE 15 MG: 15 INJECTION, SOLUTION INTRAMUSCULAR; INTRAVENOUS at 16:17

## 2025-06-20 RX ADMIN — METOCLOPRAMIDE 10 MG: 5 INJECTION, SOLUTION INTRAMUSCULAR; INTRAVENOUS at 07:31

## 2025-06-20 RX ADMIN — ONDANSETRON 4 MG: 2 INJECTION INTRAMUSCULAR; INTRAVENOUS at 07:40

## 2025-06-20 RX ADMIN — PRENATAL VIT W/ FE FUMARATE-FA TAB 27-0.8 MG 1 TABLET: 27-0.8 TAB at 16:18

## 2025-06-20 RX ADMIN — METHYLERGONOVINE MALEATE 200 MCG: 0.2 INJECTION INTRAVENOUS at 09:25

## 2025-06-20 RX ADMIN — Medication 300 MCG: at 07:59

## 2025-06-20 RX ADMIN — KETOROLAC TROMETHAMINE 30 MG: 30 INJECTION, SOLUTION INTRAMUSCULAR; INTRAVENOUS at 08:42

## 2025-06-20 RX ADMIN — SODIUM CITRATE AND CITRIC ACID MONOHYDRATE 30 ML: 500; 334 SOLUTION ORAL at 07:31

## 2025-06-20 RX ADMIN — CEFOXITIN 2000 MG: 2 INJECTION, POWDER, FOR SOLUTION INTRAVENOUS at 11:33

## 2025-06-20 RX ADMIN — MISOPROSTOL 800 MCG: 200 TABLET ORAL at 08:58

## 2025-06-20 RX ADMIN — ACETAMINOPHEN 1000 MG: 500 TABLET, FILM COATED ORAL at 12:59

## 2025-06-20 RX ADMIN — Medication 300 MCG: at 07:53

## 2025-06-20 RX ADMIN — Medication 300 MCG: at 08:04

## 2025-06-20 RX ADMIN — ACETAMINOPHEN 1000 MG: 500 TABLET, FILM COATED ORAL at 05:57

## 2025-06-20 RX ADMIN — HYDROMORPHONE HYDROCHLORIDE 0.1 MG: 1 INJECTION, SOLUTION INTRAMUSCULAR; INTRAVENOUS; SUBCUTANEOUS at 07:45

## 2025-06-20 RX ADMIN — BUPIVACAINE HYDROCHLORIDE 1.9 ML: 7.5 INJECTION, SOLUTION EPIDURAL; RETROBULBAR at 07:45

## 2025-06-20 RX ADMIN — HYDROMORPHONE HYDROCHLORIDE 0.9 MG: 1 INJECTION, SOLUTION INTRAMUSCULAR; INTRAVENOUS; SUBCUTANEOUS at 08:31

## 2025-06-20 RX ADMIN — FAMOTIDINE 20 MG: 10 INJECTION INTRAVENOUS at 07:31

## 2025-06-20 RX ADMIN — SODIUM CHLORIDE, POTASSIUM CHLORIDE, SODIUM LACTATE AND CALCIUM CHLORIDE: 600; 310; 30; 20 INJECTION, SOLUTION INTRAVENOUS at 08:06

## 2025-06-20 RX ADMIN — OXYTOCIN 30 UNITS: 10 INJECTION INTRAVENOUS at 08:07

## 2025-06-20 RX ADMIN — TRANEXAMIC ACID 1000 MG: 10 INJECTION, SOLUTION INTRAVENOUS at 10:01

## 2025-06-20 RX ADMIN — KETOROLAC TROMETHAMINE 15 MG: 15 INJECTION, SOLUTION INTRAMUSCULAR; INTRAVENOUS at 23:03

## 2025-06-20 RX ADMIN — CEFAZOLIN 1 G: 1 INJECTION, POWDER, FOR SOLUTION INTRAMUSCULAR; INTRAVENOUS; PARENTERAL at 07:51

## 2025-06-20 NOTE — OP NOTE
[facility]  GUY Valentin  : 1995  MRN: [id number]  [facility]: [id number]  Date: 2025    Operative Note        Pre-op Diagnosis:  Previous  delivery, antepartum [O34.219]  Pregnancy at 39 weeks  Undesired fertility   Post-op Diagnosis:  Same    Procedure: Procedure(s):   SECTION REPEAT WITH bilateral tubal ligation   Surgeon: Surgeon(s):  Raffaele Parra MD       Anesthesia: Spinal     Estimated Blood Loss: 1200   mLs   Fluids: 1000   mLs   UOP: 100   mLs   Drains: Gan catheter   ABx: Kefzol     Specimens:  Bilateral fallopian tube segments   Findings: There is adhesions of the uterus to the anterior abdominal wall, taken down during the procedure.  Normal uterus, tubes, and ovaries.    Complications: None       INDICATION: GUY Valentin is a 29 y.o. female who presents at 39 weeks for a scheduled repeat .  She has expressed desire for surgical sterilization.     PROCEDURE: After informed consent was obtained, the patient was taken to the operating room where spinal anesthesia was administered. Time out procedure was completed and perioperative antibiotics were administered. She was placed in the supine position with leftward tilt and her abdomen was prepped and draped in normal sterile fashion after a Gan catheter was placed by nursing staff.   After confirming adequate anesthesia, a Pfannenstiel incision was made with a scalpel through her old scar.  This was carried down sharply to the underlying fascia which was incised in the midline.  The fascial incision was extended laterally with Portillo scissors.  The fascial edges were then elevated and the underlying rectus muscles dissected off with sharp technique.  The rectus muscles were sharply  in the midline and the underlying peritoneum identified and entered sharply.  Dense adhesions were encountered from the uterus to the anterior abdominal wall.  Careful dissection was carried out to take the adhesions down  to allow space for delivery.  Adhesions were lysed with Bovie cautery.  The peritoneal incision was then extended and an Jim-O retractor inserted, taking care to avoid entrapping the bowel.  A low transverse uterine incision was made with a clean scalpel.  The uterine incision was bluntly extended and anterior placenta was encountered.  The head of the infant was delivered through the incision without difficulty and the remainder of the infant delivered with fundal pressure.  The infant was vigorous on the field, the cord was clamped and cut, and the infant handed off to waiting nursery nurse.  Cord blood was obtained and the placenta then expressed.  The uterus was repaired in situ and cleared of clot and debris with a moist laparotomy sponge.  The uterine incision was closed with a running locked suture of 0 Monocryl.  A second imbricating layer of 0 Monocryl was placed for reinforcement.  The uterine incision was hemostatic.    Attention was turned to the tubal ligation portion of the procedure.  Each fallopian tube was identified and followed out to its fimbriated end to ensure correct identification.  A portion in the isthmic region of the left tube was grasped with a Madison clamp and elevated.  A window was then made in the mesosalpinx with Bovie cautery.  The proximal and distal ends of the tubal segment were then ligated with 0 plain gut suture.  The left tubal segment was sharply excised.  The excision site was made hemostatic with Bovie cautery if needed. A portion in the isthmic region of the right tube was grasped with a Madison clamp and elevated.  A window was then made in the mesosalpinx with Bovie cautery.  The proximal and distal ends of the tubal segment were then ligated with 0 plain gut suture.  The right tubal segment was sharply excised.  The excision site was made hemostatic with Bovie cautery if needed.  The uterine incision was reinspected with hemostasis noted.  The tubal sites were  reinspected and noted to be intact and hemostatic.   The retractor was removed.  The parietal peritoneum was then closed with a running suture of 2-0 Vicryl Rapide.  The subfascial spaces and rectus muscles were inspected with hemostasis noted.  The fascia was then closed with a running suture of 0 Vicryl.  The subcutaneous tissues were irrigated and made hemostatic with Bovie cautery.  The subcutaneous tissues were reapproximated with interrupted sutures of 2-0 Vicryl Rapide.  The skin was closed with a subcuticular stitch of 3-0 Vicryl Rapide and reinforced with Steri-Strips.  A sterile dressing was then applied.    The cervix is 2 to 3 cm dilated.  Several clots were removed from the lower uterine segment and 800 mcg Cytotec placed per rectum.  After expressing the uterus the patient was transitioned to the stretcher and taken to the recovery room in stable condition.  She tolerated the procedure well without complications.  All sponge, needle, and instrument counts were correct ×3 per the OR staff.    Raffaele Parra MD   6/20/2025  09:39 CDT

## 2025-06-20 NOTE — ANESTHESIA PROCEDURE NOTES
Spinal Block      Patient reassessed immediately prior to procedure    Patient location during procedure: OB  Indication:at surgeon's request  Preanesthetic Checklist  Completed: patient identified, IV checked, site marked, risks and benefits discussed, surgical consent, monitors and equipment checked, pre-op evaluation and timeout performed  Spinal Block Prep:  Patient Position:sitting  Sterile Tech:cap, gloves and sterile barriers  Prep:Chloraprep  Patient Monitoring:EKG, continuous pulse oximetry and blood pressure monitoring    Spinal Block Procedure  Location:L3-L4  Needle Type:Sprotte  Needle Gauge:24 G  Placement of Spinal needle event:cerebrospinal fluid aspirated  Paresthesia: no  Fluid Appearance:clear  Medications: bupivacaine PF (MARCAINE) 0.75% - Intrathecal   1.9 mL - 6/20/2025 7:45:00 AM   Post Assessment  Patient Tolerance:patient tolerated the procedure well with no apparent complications  Complications no

## 2025-06-20 NOTE — PROGRESS NOTES
Raffaele Parra MD  [facility]  [address]  [place] [address]  Office [phone]  Fax [phone]      [facility]  GUY Valentin  : 1995  MRN: [id number]  CSN: [id number]    Labor progress note  Called to see patient about continued passage of clots with fundal checks.    Subjective   She is comfortable in the recovery bed     Objective   Min/max vitals past 24 hours:  Temp  Min: 98.5 °F (36.9 °C)  Max: 98.7 °F (37.1 °C)   BP  Min: 116/82  Max: 140/81   Pulse  Min: 81  Max: 96   Resp  Min: 16  Max: 18        Abdomen: Dressing clean and dry   Pelvic: Cervix dilated 2 to 3 cm.  Several clots broken up and expressed from the lower uterine segment.       QBL related to delivery now around 2000 mL   She has already received Methergine and Cytotec  Jaylene device placed according to  instructions.  Vaginal balloon deflated and device inserted through the cervix into the lower uterine segment.  Once the active part of the device was fully passed to the cervix, the vaginal balloon was inflated with 120 mL s prior terile saline.  Device then hooked up to continuous suction at 80 mmHg.  Uterine fundus firm and no further active bleeding.  Tranexamic acid 1 g IV ordered.    Admission Hgb 12.4    Assessment   Fetal macrosomia  Postpartum hemorrhage due to atony of lower uterine segment     Plan   1.   Will monitor output from Jaylene device as well as any vaginal bleeding.  2.  Will check CBC, Fibrinogen    Raffaele Parra MD  2025  10:14 CDT

## 2025-06-20 NOTE — LACTATION NOTE
Mother's Name: Mena  Phone #: [phone]  Infant Name: unsure   : 2025 @ 0806  Gestation: 39w0d  Day of life: 0  Birth weight:  10-10 (4820 g) LGA  Discharge weight:  Weight Loss:   24 hour Summary of Feeds:  Voids:  Stools:  Assistive devices (shields, shells, etc):  Significant Maternal history: , BF 1st child 3 months, 2nd child 2 years, GDM   Maternal Concerns:    Maternal Goal: atleast 1 year  Mother's Medications: Lantus and PNV  Breastpump for home: yes from insurance  Ped follow up appt: unsure, P     Called to assist with first feeding in [facility]. Infant on warmer sucking on pacifier. Infant brought to mother and placed skin to skin, with permission assisted with latching infant on left breast in cross cradle hold. Infant demonstrating deep jaw drops and occasional swallows. After 13 minutes infant was removed from breast due to maternal acuity. Infant wrapped in blanket and passed to FOB to hold. After bedside procedure completed and mother was able to rest, mother requested to continue feeding infant. Assisted with repositioning infant STS with mother and placed in cross cradle hold at the right breast. Infant latched well and demonstrated deep jaw drops and occasional swallows. Much praise provided! Educated mother on blood glucose monitoring for infant due to LGA and GDM. Educated mother that if there is a medical reason to supplement that we can hand express or pump to collect colostrum or use DBM or formula. Mother verbalized understanding. After 15 minutes of feeding, infant self released and was placed on mother's chest STS. Educated on benefits of STS and delayed bathing. Mother verbalized understanding. Educated to feed infant atleast every 3 hours or sooner if infant is demonstrating hunger cues. Mother verbalized understanding. Encouraged to call if needing assistance or with any questions or concerns.     Instructed patient our lactation team is here for continued support  throughout their breastfeeding journey. Our team has encouraged patient to call with any questions or concerns that may arise after discharge.     Breastfeeding and Diaper Chart  Check List for Essentials of Positioning And Latch-on handout provided by Lactation Education Resources  Hand Expression handout provided by Lactation Education Resources  Five Keys to Successful Breastfeeding handout provided by Lactation Education Resources    The Many Benefits if Breastfeeding handout given  Breastfeeding saves time  *Breastfeeding allows you to calm or feed your baby immediately, which leads to a happier baby who cries less  *There is nothing to buy, prepare, or maintain.There is nothing to clean or sterilize.  Breastfeeding builds a mothers confidence  *She knows all her baby needs to thrive is her!  Breastfeeding saves Money  *There is no formula to buy and healthier breast fed babies have less medical costs  Healthy Mom/Healthy baby  * babies get sick less often, and when they do they are usually sick less severely and for a shorter time  * babies have fewer ear infections  * babies have fewer allergies  *Mothers who breastfeed have a lower risk for cancer, osteoporosis, anemia, high blood pressure, obesity, and Type ll diabetes  *Mothers miss less work days with sick babies  Breast fed babies have a better dental health  * babies have better jaw development which requires lest orthodontic work  *Breast milk does not promote cavities  * babies can nurse at night without worry of tooth decay  Breastfeeding allows a baby to reach his full IQ potential  *The longer a baby is breast fed, the better their brain development  Breast fed babies and moms are more relaxed  *The hormones released during breastfeeding have a calming effect on mothers  *Breastfeeding requires mom to take a break; this may help mom get more rest after delivery  *Breastfeeding is quicker than preparing  formula which allows mom and baby to get back to sleep faster  *Breastfeeding promotes bonding and allows mom to learn babies cues and care needs more quickly  Breastfeeding cleanup is easier  *The bowel movements and spit up of breast fed babies doesn't smell as bad  *Spit-up of breast fed babies doesn't stain clothing  Getting out of the hourse is easier  *No formula bottles to prepare and carry safely   *No time restraints due to worry about what baby will eat  *No worries about warming a bottle or finding safe water to prepare bottles  Breastfeeding mother get their bodies back sooner  *The uterus shrinks more quickly and completely, which allows a flatter tummy  *Breastfeeding burns 400-500 calories a day; making milk torches stored fat!  Breastfeeding is better for the environment  *There is no trash to dispose of after breastfeeding  *There is no production facility to produce breast milk; moms body does it all without the pollution of a factory      Your Guide to Breastfeeding Booklet by [employer], www.tsumobi      Safe Storage of Breastmilk magnet: nuPSYS

## 2025-06-20 NOTE — DISCHARGE INSTR - APPOINTMENTS
Dr. Case has ordered you and your infant an Outpatient Lactation Follow up appointment on June 25th at 1 pm at our [facility] location at [address], [address] with CHRISTIN Jones. Please arrive 15 minutes early to get registered for your Outpatient Lactation Clinic Appointment. You can reach [facility] Lactation Department at [phone].          Appointment with  on June 30th at 2:30 pm     Appointment with  on August 1st at 11:15 am

## 2025-06-20 NOTE — ANESTHESIA PREPROCEDURE EVALUATION
Anesthesia Evaluation     Patient summary reviewed                Airway   Dental      Pulmonary    Cardiovascular         Neuro/Psych  GI/Hepatic/Renal/Endo    (+) diabetes mellitus gestational    Musculoskeletal     Abdominal    Substance History      OB/GYN    (+) Pregnant        Other                    Anesthesia Plan    ASA 2     spinal         Plan discussed with CRNA.    CODE STATUS:    Code Status (Patient has no pulse and is not breathing): CPR (Attempt to Resuscitate)  Medical Interventions (Patient has pulse or is breathing): Full Support

## 2025-06-20 NOTE — PROGRESS NOTES
JAYLENE in place for PPH. Bleeding has been minimal. Jaylene suction turned off and balloon deflated. After 30 minutes, uterus firm to palpation and bleeding minimal on uterine massage. Jaylene to be removed. Will continue to monitor for 30 minutes before transferring to the floor. VSS.

## 2025-06-20 NOTE — PLAN OF CARE
Goal Outcome Evaluation:      VSS.  Fundus F, to the R, 1U.  Scant lochia.  Abd agusto and SCDs noted.  Abd drsg D & I.  Pt is on ERAS protocol-controlling her pain.  Pt is breastfeeding and bonding al with infant.

## 2025-06-20 NOTE — PLAN OF CARE
Goal Outcome Evaluation:  Plan of Care Reviewed With: patient        Progress: improving  Outcome Evaluation: Patient arrived to LDR fror scheduled C/S

## 2025-06-21 PROBLEM — O24.414 INSULIN CONTROLLED GESTATIONAL DIABETES MELLITUS (GDM) IN THIRD TRIMESTER: Status: ACTIVE | Noted: 2025-06-21

## 2025-06-21 PROBLEM — Z3A.39 39 WEEKS GESTATION OF PREGNANCY: Status: ACTIVE | Noted: 2025-06-21

## 2025-06-21 LAB
BASOPHILS # BLD AUTO: 0.04 10*3/MM3 (ref 0–0.2)
BASOPHILS NFR BLD AUTO: 0.4 % (ref 0–1.5)
DEPRECATED RDW RBC AUTO: 42.9 FL (ref 37–54)
EOSINOPHIL # BLD AUTO: 0.16 10*3/MM3 (ref 0–0.4)
EOSINOPHIL NFR BLD AUTO: 1.5 % (ref 0.3–6.2)
ERYTHROCYTE [DISTWIDTH] IN BLOOD BY AUTOMATED COUNT: 14.1 % (ref 12.3–15.4)
GLUCOSE BLDC GLUCOMTR-MCNC: 132 MG/DL (ref 70–130)
GLUCOSE BLDC GLUCOMTR-MCNC: 137 MG/DL (ref 70–130)
GLUCOSE BLDC GLUCOMTR-MCNC: 150 MG/DL (ref 70–130)
GLUCOSE BLDC GLUCOMTR-MCNC: 76 MG/DL (ref 70–130)
HCT VFR BLD AUTO: 28.2 % (ref 34–46.6)
HGB BLD-MCNC: 9.4 G/DL (ref 12–15.9)
IMM GRANULOCYTES # BLD AUTO: 0.09 10*3/MM3 (ref 0–0.05)
IMM GRANULOCYTES NFR BLD AUTO: 0.8 % (ref 0–0.5)
LYMPHOCYTES # BLD AUTO: 1.86 10*3/MM3 (ref 0.7–3.1)
LYMPHOCYTES NFR BLD AUTO: 17.1 % (ref 19.6–45.3)
MCH RBC QN AUTO: 28.1 PG (ref 26.6–33)
MCHC RBC AUTO-ENTMCNC: 33.3 G/DL (ref 31.5–35.7)
MCV RBC AUTO: 84.2 FL (ref 79–97)
MONOCYTES # BLD AUTO: 0.69 10*3/MM3 (ref 0.1–0.9)
MONOCYTES NFR BLD AUTO: 6.3 % (ref 5–12)
NEUTROPHILS NFR BLD AUTO: 73.9 % (ref 42.7–76)
NEUTROPHILS NFR BLD AUTO: 8.03 10*3/MM3 (ref 1.7–7)
NRBC BLD AUTO-RTO: 0 /100 WBC (ref 0–0.2)
PLATELET # BLD AUTO: 184 10*3/MM3 (ref 140–450)
PMV BLD AUTO: 11.4 FL (ref 6–12)
RBC # BLD AUTO: 3.35 10*6/MM3 (ref 3.77–5.28)
WBC NRBC COR # BLD AUTO: 10.87 10*3/MM3 (ref 3.4–10.8)

## 2025-06-21 PROCEDURE — 85025 COMPLETE CBC W/AUTO DIFF WBC: CPT | Performed by: OBSTETRICS & GYNECOLOGY

## 2025-06-21 PROCEDURE — 25010000002 KETOROLAC TROMETHAMINE PER 15 MG: Performed by: OBSTETRICS & GYNECOLOGY

## 2025-06-21 PROCEDURE — 0503F POSTPARTUM CARE VISIT: CPT | Performed by: OBSTETRICS & GYNECOLOGY

## 2025-06-21 PROCEDURE — 82948 REAGENT STRIP/BLOOD GLUCOSE: CPT

## 2025-06-21 PROCEDURE — 25010000002 ENOXAPARIN PER 10 MG: Performed by: OBSTETRICS & GYNECOLOGY

## 2025-06-21 RX ORDER — FERROUS SULFATE 325(65) MG
325 TABLET ORAL 2 TIMES DAILY WITH MEALS
Status: DISCONTINUED | OUTPATIENT
Start: 2025-06-21 | End: 2025-06-23 | Stop reason: HOSPADM

## 2025-06-21 RX ORDER — ASCORBIC ACID 500 MG
500 TABLET ORAL DAILY
Status: DISCONTINUED | OUTPATIENT
Start: 2025-06-21 | End: 2025-06-23 | Stop reason: HOSPADM

## 2025-06-21 RX ADMIN — ENOXAPARIN SODIUM 40 MG: 100 INJECTION SUBCUTANEOUS at 11:00

## 2025-06-21 RX ADMIN — KETOROLAC TROMETHAMINE 15 MG: 15 INJECTION, SOLUTION INTRAMUSCULAR; INTRAVENOUS at 10:30

## 2025-06-21 RX ADMIN — KETOROLAC TROMETHAMINE 15 MG: 15 INJECTION, SOLUTION INTRAMUSCULAR; INTRAVENOUS at 05:14

## 2025-06-21 RX ADMIN — ENOXAPARIN SODIUM 40 MG: 100 INJECTION SUBCUTANEOUS at 22:18

## 2025-06-21 RX ADMIN — ACETAMINOPHEN 650 MG: 325 TABLET ORAL at 14:29

## 2025-06-21 RX ADMIN — ACETAMINOPHEN 1000 MG: 500 TABLET, FILM COATED ORAL at 02:09

## 2025-06-21 RX ADMIN — FERROUS SULFATE TAB 325 MG (65 MG ELEMENTAL FE) 325 MG: 325 (65 FE) TAB at 17:33

## 2025-06-21 RX ADMIN — ACETAMINOPHEN 1000 MG: 500 TABLET, FILM COATED ORAL at 08:45

## 2025-06-21 RX ADMIN — OXYCODONE HYDROCHLORIDE AND ACETAMINOPHEN 500 MG: 500 TABLET ORAL at 11:00

## 2025-06-21 RX ADMIN — PRENATAL VIT W/ FE FUMARATE-FA TAB 27-0.8 MG 1 TABLET: 27-0.8 TAB at 08:49

## 2025-06-21 RX ADMIN — FERROUS SULFATE TAB 325 MG (65 MG ELEMENTAL FE) 325 MG: 325 (65 FE) TAB at 11:00

## 2025-06-21 RX ADMIN — IBUPROFEN 600 MG: 600 TABLET ORAL at 17:00

## 2025-06-21 RX ADMIN — ACETAMINOPHEN 650 MG: 325 TABLET ORAL at 20:14

## 2025-06-21 RX ADMIN — DOCUSATE SODIUM 100 MG: 100 CAPSULE, LIQUID FILLED ORAL at 08:49

## 2025-06-21 RX ADMIN — OXYCODONE HYDROCHLORIDE 5 MG: 5 TABLET ORAL at 17:32

## 2025-06-21 RX ADMIN — IBUPROFEN 600 MG: 600 TABLET ORAL at 22:18

## 2025-06-21 NOTE — PLAN OF CARE
Goal Outcome Evaluation:  Plan of Care Reviewed With: patient        Progress: improving  Outcome Evaluation: VSS,  FFMLUU, scant rubra, ALT  incision with pressure dressing, magana out DTV, ambulating

## 2025-06-21 NOTE — PLAN OF CARE
Goal Outcome Evaluation:  Plan of Care Reviewed With: patient, spouse        Progress: improving  Outcome Evaluation: vss, ffmluu, scant lochia, psg dsg removed- steri strips in place along LTV incision, voiding and ambulating, shower this shift, bf well.

## 2025-06-21 NOTE — LACTATION NOTE
Mother's Name: Mena  Phone #: [phone]  Infant Name: unsure   : 2025 @ 0806  Gestation: 39w0d  Day of life: 1 (28 hours)  Birth weight:  10-10 (4820 g) LGA  Discharge weight:  Weight Loss: -3.32%  24 hour Summary of Feeds: 9 Voids: 5 Stools: 4  Assistive devices (shields, shells, etc):  Significant Maternal history: , BF 1st child 3 months, 2nd child 2 years, GDM   Maternal Concerns:    Maternal Goal: atleast 1 year  Mother's Medications: Lantus and PNV  Breastpump for home: yes from insurance  Ped follow up appt: Manpreet     Visited with pt in her room for f/u lactation consult. Pt states that feedings seem to have gone very well so far and that she has no concerns or questions. She did ask for a hand pump and this RN provided one to her. Nipples measured- 24 mm flange will likely fit best.   Since pt is likely being d/c tomorrow and lactation is not here, gave her breastfeeding after d/c packet and reviewed and made outpt appt for Monday at 12 at [facility]. Left lactation phone number on white board and encouraged to call as she needs assistance.     Signs of Milk: Fullness, firmness, heaviness of breasts, leaking of milk.  Signs of Good Feed: Breast fullness prior to feed, breasts soft and comfortable after feeding. Infant content after feeding: calm, sleepy, relaxed and without continued hunger cues.  Signs of Plugged Ducts, Engorgement and Mastitis: Plugged ducts (milk entrapment in milk ducts)- small tender knots that often feel like little beans under breast tissue, usually tender. Massage on these areas of concern while breastfeeding or pumping to promote emptying.   Engorgement- fluid or excess milk, breasts become uncomfortably full, tight, firm (compare to the firmness of your cheek (mild), chin (moderate) or forehead (severe). First line of treatment should be to BREASTFEED, if breasts remain full feeling after a feeding, it may be necessary to pump, ONLY UNTIL BREASTS ARE SOFT  AND COMFORTABLE. DO NOT OVER PUMP (complete emptying of breasts can trigger even more milk which will cause continued, recurrent Engorgement).  Mastitis- Infection of the breast tissue, most often caused by plugged ducts that are not adequately treated by emptying, recurrent trauma to nipples or breasts (cracked or bleeding nipples). Signs: redness, swelling, tender knots or fever to breasts as well as generalized fever >101 degrees F that is often sudden onset. Treatment of mastitis, BREASTFEED! Pump after breastfeeding to achieve COMPLETE emptying of affected breast, utilizing massage to areas of concern, may use cold compress to affected area only after breast emptying. May take anti-inflammatories i.e. Ibuprofen, Motrin. CALL your OB for assessment and continued treatment with Antibiotics to adequately treat mastitis.  Infant Care: Over the first 2 weeks it is important to keep record of infant's feeding routine (feeding times and durations), wet and dirty diaper frequency, stool color and any spit ups that may occur.  Keep in mind, ALL babies will lose some weight initially (usually no more than 10% by day 3). Until infant returns to/ surpasses birth weight (which can take up to 2 weeks), it is important to offer feedings AT LEAST EVERY 3 HOURS. Remember, if you choose to supplement infant with formula or previously pumped milk, you should always pump in replacement of that feeding in order to promote and maintain a healthy milk supply!  Maternal Care: REST, sleep when the infant sleeps, stay hydrated (water is optimal) drink to thirst, increase caloric intake - breastfeeding mother's need an ADDITIONAL 500 calories per day , eat 3 meals/day as well as snacks in between, limit CAFFIENE intake to 2 cups/day. Ask your significant other, family members or friends for help when needed, taking advantage of meal trains, allowing others to help with laundry, house chores, etc can help you focus on what is most  important early on after delivery… you and your infant, and breastfeeding!   Medications to CONTINUE: Prenatal Vitamins are important to continue taking while breastfeeding. Fish oil, magnesium/calcium supplements often are helpful to support Mothers and their milk supply as well. Tylenol, Ibuprofen, regular Zyrtec, Claritin are SAFE if you suffer from seasonal allergies. Flonase is safe and often an effective medication to take if suffering from sinus drainage/pressure.  Medications to AVOID: Benadryl, Sudafed, any medications including “DM” or have a drying effect to sinus drainage will also dry a mother's milk up. Birth control- your OB will want to address birth control options with you usually around 4-6 weeks postpartum, be sure to notify your MD if you continue to breastfeed as some birth controls may significantly decrease your milk supply. Herbals- some herbs may also decrease your milk supply: PEPPERMINT, MENTHOL in any form (candies, essential oils, teas, etc), so check labels and avoid using in excess.  Pumping: Although we encourage you to focus on breastfeeding over the first 2-4 weeks, you will need to plan to begin pumping. We do recommend implementing pumping by the time infant is 4 weeks old. Pump 2-3 times per day immediately AFTER breastfeeding, it is normal to collect very small amounts initially, but the more consistently you pump, the more you will begin to collect. Store collected milk in refrigerator or freezer. You should also begin offering infant a bottle around 4 weeks. Remember to use slow flow nipples and PACE the bottle-feed. A bottle feed should take about as long as a breastfeeding session.

## 2025-06-21 NOTE — PROGRESS NOTES
"    Ez Richmond MD  [facility]  [address]  [place] [address]  Office [phone]  Fax [phone]      [facility]   PROGRESS NOTE    Post-Op Day 1 S/P   Subjective     Patient reports:  Pain is well controlled with ERAS.  She is ambulating. Tolerating diet. Voiding - without difficulty; flatus reported..  Vaginal bleeding is as much as expected. Denies feeling dizzy, lightheaded or SOB.     Breastfeeding: infant latching without difficulty.      Objective      Vitals: Vital Signs Range for the last 24 hours  Temperature: Temp:  [98.2 °F (36.8 °C)-98.7 °F (37.1 °C)] 98.6 °F (37 °C)   Temp Source: Temp src: Oral   BP: BP: ()/() 129/65   Pulse: Heart Rate:  [] 92   Respirations: Resp:  [16-18] 18   SPO2: SpO2:  [96 %-100 %] 98 %   O2 Amount (l/min):     O2 Devices Device (Oxygen Therapy): room air   Weight:         Visit Vitals  /65 (BP Location: Right arm, Patient Position: Sitting)   Pulse 92   Temp 98.6 °F (37 °C) (Oral)   Resp 18   Ht 175.3 cm (69\")   Wt (!) 141 kg (311 lb 3.2 oz)   SpO2 98%   Breastfeeding Yes   BMI 45.96 kg/m²         I/Os: Intake/Output                   25 0701 - 25 0700     [id number] [id number] Total              Intake    I.V.  1141  -- 1141    Total Intake 1141 -- 1141       Output    Urine  350  1300 1650    Blood  2178  -- 2178    Total Output 2528 1300 3828             Physical Exam    Lungs Non-labored, symmetrical chest rise   Abdomen Soft, no TTP, no distension   Incision  healing well, no drainage, no erythema, no hernia, no seroma, no swelling, well approximated   Extremities extremities normal, atraumatic, no cyanosis or edema and Homans sign is negative, no sign of DVT       Result Review     I reviewed the patient's new clinical results.  Lab Results (last 24 hours)       Procedure Component Value Units Date/Time    CBC & Differential [[id number]]  (Abnormal) Collected: 25 0757    Specimen: Blood " Updated: 06/21/25 0835    Narrative:      The following orders were created for panel order CBC & Differential.  Procedure                               Abnormality         Status                     ---------                               -----------         ------                     CBC Auto Differential[[id number]]        Abnormal            Final result                 Please view results for these tests on the individual orders.    CBC Auto Differential [[id number]]  (Abnormal) Collected: 06/21/25 0757    Specimen: Blood Updated: 06/21/25 0835     WBC 10.87 10*3/mm3      RBC 3.35 10*6/mm3      Hemoglobin 9.4 g/dL      Hematocrit 28.2 %      MCV 84.2 fL      MCH 28.1 pg      MCHC 33.3 g/dL      RDW 14.1 %      RDW-SD 42.9 fl      MPV 11.4 fL      Platelets 184 10*3/mm3      Neutrophil % 73.9 %      Lymphocyte % 17.1 %      Monocyte % 6.3 %      Eosinophil % 1.5 %      Basophil % 0.4 %      Immature Grans % 0.8 %      Neutrophils, Absolute 8.03 10*3/mm3      Lymphocytes, Absolute 1.86 10*3/mm3      Monocytes, Absolute 0.69 10*3/mm3      Eosinophils, Absolute 0.16 10*3/mm3      Basophils, Absolute 0.04 10*3/mm3      Immature Grans, Absolute 0.09 10*3/mm3      nRBC 0.0 /100 WBC     POC Glucose Once [[id number]]  (Normal) Collected: 06/21/25 0710    Specimen: Blood Updated: 06/21/25 0721     Glucose 76 mg/dL      [facility] : [id number] [facility] [id number]       POC Glucose Once [[id number]]  (Normal) Collected: 06/20/25 1829    Specimen: Blood Updated: 06/20/25 1850     Glucose 88 mg/dL      Comment: : [id number] [facility] ID: [id number]       Treponema pallidum AB w/Reflex RPR [[id number]]  (Normal) Collected: 06/20/25 0555    Specimen: Blood Updated: 06/20/25 1236     Treponemal AB Total Non-Reactive    Narrative:      Reactive results will reflex RPR testing.    Fibrinogen [[id number]]  (Abnormal) Collected: 06/20/25 1030    Specimen: Blood Updated: 06/20/25 1051     Fibrinogen 513 mg/dL      CBC (No Diff) [[id number]]  (Abnormal) Collected: 06/20/25 1030    Specimen: Blood Updated: 06/20/25 1036     WBC 9.77 10*3/mm3      RBC 4.22 10*6/mm3      Hemoglobin 11.9 g/dL      Hematocrit 35.4 %      MCV 83.9 fL      MCH 28.2 pg      MCHC 33.6 g/dL      RDW 14.3 %      RDW-SD 43.3 fl      MPV 11.4 fL      Platelets 199 10*3/mm3             Prenatal Labs  Lab Results   Component Value Date    HGB 9.4 (L) 06/21/2025    RUBELLAABIGG 1.61 11/04/2024    HEPBSAG Negative 11/04/2024    ABSCRN Negative 06/20/2025    HVW8HUE6 Non Reactive 11/04/2024     (H) 12/30/2024    URINECX Final report 11/04/2024    CHLAMNAA Negative 11/04/2024    NGONORRHON Negative 11/04/2024       Immunizations:   Immunization History   Administered Date(s) Administered    Tdap 09/24/2021     Lab Results (last 24 hours)       Procedure Component Value Units Date/Time    CBC & Differential [[id number]]  (Abnormal) Collected: 06/21/25 0757    Specimen: Blood Updated: 06/21/25 0835    Narrative:      The following orders were created for panel order CBC & Differential.  Procedure                               Abnormality         Status                     ---------                               -----------         ------                     CBC Auto Differential[[id number]]        Abnormal            Final result                 Please view results for these tests on the individual orders.    CBC Auto Differential [[id number]]  (Abnormal) Collected: 06/21/25 0757    Specimen: Blood Updated: 06/21/25 0835     WBC 10.87 10*3/mm3      RBC 3.35 10*6/mm3      Hemoglobin 9.4 g/dL      Hematocrit 28.2 %      MCV 84.2 fL      MCH 28.1 pg      MCHC 33.3 g/dL      RDW 14.1 %      RDW-SD 42.9 fl      MPV 11.4 fL      Platelets 184 10*3/mm3      Neutrophil % 73.9 %      Lymphocyte % 17.1 %      Monocyte % 6.3 %      Eosinophil % 1.5 %      Basophil % 0.4 %      Immature Grans % 0.8 %      Neutrophils, Absolute 8.03 10*3/mm3      Lymphocytes, Absolute  1.86 10*3/mm3      Monocytes, Absolute 0.69 10*3/mm3      Eosinophils, Absolute 0.16 10*3/mm3      Basophils, Absolute 0.04 10*3/mm3      Immature Grans, Absolute 0.09 10*3/mm3      nRBC 0.0 /100 WBC     POC Glucose Once [[id number]]  (Normal) Collected: 25 0710    Specimen: Blood Updated: 25 0721     Glucose 76 mg/dL      Comment: : [id number] [facility] ID: [id number]       POC Glucose Once [[id number]]  (Normal) Collected: 25 1829    Specimen: Blood Updated: 25 1850     Glucose 88 mg/dL      [facility] [id number] [facility] [id number]       Treponema pallidum AB w/Reflex RPR [[id number]]  (Normal) Collected: 25 0555    Specimen: Blood Updated: 25 1236     Treponemal AB Total Non-Reactive    Narrative:      Reactive results will reflex RPR testing.    Fibrinogen [[id number]]  (Abnormal) Collected: 25 1030    Specimen: Blood Updated: 25 1051     Fibrinogen 513 mg/dL     CBC (No Diff) [[id number]]  (Abnormal) Collected: 25 1030    Specimen: Blood Updated: 25 1036     WBC 9.77 10*3/mm3      RBC 4.22 10*6/mm3      Hemoglobin 11.9 g/dL      Hematocrit 35.4 %      MCV 83.9 fL      MCH 28.2 pg      MCHC 33.6 g/dL      RDW 14.3 %      RDW-SD 43.3 fl      MPV 11.4 fL      Platelets 199 10*3/mm3             CBC          3/31/2025    08:48 2025    05:55 2025    10:30 2025    07:57   CBC   WBC  9.59  9.77  10.87    RBC  4.43  4.22  3.35    Hemoglobin 13.1  12.4  11.9  9.4    Hematocrit  36.9  35.4  28.2    MCV  83.3  83.9  84.2    MCH  28.0  28.2  28.1    MCHC  33.6  33.6  33.3    RDW  14.1  14.3  14.1    Platelets  182  199  184       Latest Reference Range & Units 25 05:55 25 18:29 25 07:10   Glucose 70 - 130 mg/dL 95 88 76         Postpartum Depression: Low Risk  (2025)    Vega  Depression Scale     Last EPDS Total Score: 0     Last EPDS Self Harm Result: Never              Assessment &  Plan        Previous  delivery, antepartum    Previous  section    PPH (postpartum hemorrhage)    39 weeks gestation of pregnancy    Insulin controlled gestational diabetes mellitus (GDM) in third trimester        Assessment:    GUY Valentin is Day 1  post-partum  , Low Transverse  .       Plan:    continue post op care.  PO iron supplement  Lactation services PRN  Glucoses - continue monitoring fasting and 1-hr postprandial; hold insulin  Plan for discharge tomorrow      zE Richmond MD  2025  09:22 CDT

## 2025-06-21 NOTE — ANESTHESIA POSTPROCEDURE EVALUATION
Patient: GUY Valentin    Procedure Summary       Date: 25 Room / Location: [facility] LABOR DELIVERY 1 /  PAD LABOR DELIVERY    Anesthesia Start: 740 Anesthesia Stop: 905    Procedure:  SECTION REPEAT WITH TUBAL (Bilateral: Abdomen) Diagnosis:       Previous  delivery, antepartum      (Previous  delivery, antepartum [O34.219])    Surgeons: Raffaele Parra MD Provider: Paddy Dhillon CRNA    Anesthesia Type: spinal ASA Status: 2            Anesthesia Type: spinal    Vitals  Vitals Value Taken Time   /56 25 11:38   Temp 98.4 °F (36.9 °C) 25 11:38   Pulse 89 25 11:38   Resp 18 25 11:38   SpO2 95 % 25 11:38           Post Anesthesia Care and Evaluation    Patient location during evaluation: PACU  Patient participation: complete - patient participated  Level of consciousness: awake and alert  Pain score: 0  Pain management: adequate    Airway patency: patent  Anesthetic complications: No anesthetic complications    Cardiovascular status: acceptable and stable  Respiratory status: acceptable and unassisted  Hydration status: acceptable  Post Neuraxial Block status: Motor and sensory function returned to baseline and No signs or symptoms of PDPH

## 2025-06-22 LAB
GLUCOSE 1H P 100 G GLC PO SERPL-MCNC: 219 MG/DL (ref 74–180)
GLUCOSE 2H P 100 G GLC PO SERPL-MCNC: 89 MG/DL (ref 74–155)
GLUCOSE BLDC GLUCOMTR-MCNC: 129 MG/DL (ref 70–130)
GLUCOSE BLDC GLUCOMTR-MCNC: 140 MG/DL (ref 70–130)
GLUCOSE BLDC GLUCOMTR-MCNC: 181 MG/DL (ref 70–130)
GLUCOSE P FAST SERPL-MCNC: 101 MG/DL (ref 74–106)

## 2025-06-22 PROCEDURE — 82951 GLUCOSE TOLERANCE TEST (GTT): CPT | Performed by: OBSTETRICS & GYNECOLOGY

## 2025-06-22 PROCEDURE — 0503F POSTPARTUM CARE VISIT: CPT | Performed by: OBSTETRICS & GYNECOLOGY

## 2025-06-22 PROCEDURE — 63710000001 INSULIN GLARGINE PER 5 UNITS: Performed by: OBSTETRICS & GYNECOLOGY

## 2025-06-22 PROCEDURE — 25010000002 ENOXAPARIN PER 10 MG: Performed by: OBSTETRICS & GYNECOLOGY

## 2025-06-22 RX ORDER — METOCLOPRAMIDE 10 MG/1
10 TABLET ORAL
Status: DISCONTINUED | OUTPATIENT
Start: 2025-06-22 | End: 2025-06-23 | Stop reason: HOSPADM

## 2025-06-22 RX ADMIN — ACETAMINOPHEN 650 MG: 325 TABLET ORAL at 02:21

## 2025-06-22 RX ADMIN — ENOXAPARIN SODIUM 40 MG: 100 INJECTION SUBCUTANEOUS at 10:23

## 2025-06-22 RX ADMIN — ACETAMINOPHEN 650 MG: 325 TABLET ORAL at 14:29

## 2025-06-22 RX ADMIN — ENOXAPARIN SODIUM 40 MG: 100 INJECTION SUBCUTANEOUS at 22:19

## 2025-06-22 RX ADMIN — DOCUSATE SODIUM 100 MG: 100 CAPSULE, LIQUID FILLED ORAL at 22:20

## 2025-06-22 RX ADMIN — IBUPROFEN 600 MG: 600 TABLET ORAL at 10:23

## 2025-06-22 RX ADMIN — DOCUSATE SODIUM 100 MG: 100 CAPSULE, LIQUID FILLED ORAL at 10:23

## 2025-06-22 RX ADMIN — PRENATAL VIT W/ FE FUMARATE-FA TAB 27-0.8 MG 1 TABLET: 27-0.8 TAB at 08:49

## 2025-06-22 RX ADMIN — IBUPROFEN 600 MG: 600 TABLET ORAL at 22:20

## 2025-06-22 RX ADMIN — OXYCODONE HYDROCHLORIDE AND ACETAMINOPHEN 500 MG: 500 TABLET ORAL at 08:48

## 2025-06-22 RX ADMIN — INSULIN GLARGINE 20 UNITS: 100 INJECTION, SOLUTION SUBCUTANEOUS at 20:16

## 2025-06-22 RX ADMIN — FERROUS SULFATE TAB 325 MG (65 MG ELEMENTAL FE) 325 MG: 325 (65 FE) TAB at 18:06

## 2025-06-22 RX ADMIN — ACETAMINOPHEN 650 MG: 325 TABLET ORAL at 08:48

## 2025-06-22 RX ADMIN — IBUPROFEN 600 MG: 600 TABLET ORAL at 15:30

## 2025-06-22 RX ADMIN — FERROUS SULFATE TAB 325 MG (65 MG ELEMENTAL FE) 325 MG: 325 (65 FE) TAB at 08:48

## 2025-06-22 RX ADMIN — IBUPROFEN 600 MG: 600 TABLET ORAL at 05:47

## 2025-06-22 RX ADMIN — ACETAMINOPHEN 650 MG: 325 TABLET ORAL at 20:16

## 2025-06-22 RX ADMIN — OXYCODONE HYDROCHLORIDE 5 MG: 5 TABLET ORAL at 15:30

## 2025-06-22 NOTE — PROGRESS NOTES
"    Ez Richmond MD  [facility]  [address]  [place] [address]  Office [phone]  Fax [phone]      [facility]   PROGRESS NOTE    Post-Op Day 2 S/P   Subjective     Patient reports:  Pain is well controlled with ERAS.  She is ambulating. Tolerating diet. Voiding - without difficulty; flatus reported..  Vaginal bleeding is as much as expected. Breasts feel engorged - states not much let down yet. Currently hand pumping. Has not used breast pump yet. Worried about baby's weight loss - recommended to discuss with Pediatrics today. Blood sugars - elevated yesterday. Doing 2-hr GTT today.     Breastfeeding: infant latching with difficulty.      Objective      Vitals: Vital Signs Range for the last 24 hours  Temperature: Temp:  [97.7 °F (36.5 °C)-98.4 °F (36.9 °C)] 97.7 °F (36.5 °C)   Temp Source: Temp src: Temporal   BP: BP: (127-141)/(56-69) 141/69   Pulse: Heart Rate:  [] 100   Respirations: Resp:  [16-18] 16   SPO2: SpO2:  [95 %-97 %] 97 %   O2 Amount (l/min):     O2 Devices Device (Oxygen Therapy): room air   Weight:         Visit Vitals  /69 (BP Location: Left arm, Patient Position: Sitting)   Pulse 100   Temp 97.7 °F (36.5 °C) (Temporal)   Resp 16   Ht 175.3 cm (69\")   Wt (!) 141 kg (311 lb 3.2 oz)   SpO2 97%   Breastfeeding Yes   BMI 45.96 kg/m²         I/Os: Intake/Output                         25 0701 - 25 0700 25 0701 - 25 0700     4707-7506 5408-4257 Total 9027-2443 0367-5090 Total                 Intake    I.V.  1141  -- 1141  --  -- --    Total Intake 1141 -- 1141 -- -- --       Output    Urine  350  1300 1650  300  -- 300    Blood  2178  -- 2178  --  -- --    Total Output 2528 1300 3828 300 -- 300             Physical Exam    Lungs Non-labored, symmetrical chest rise   Abdomen Soft, no TTP, no distension   Incision  healing well, no drainage, no erythema, no hernia, no seroma, no swelling, well approximated   Extremities " extremities normal, atraumatic, no cyanosis or edema and Homans sign is negative, no sign of DVT       Result Review     I reviewed the patient's new clinical results.  Lab Results (last 24 hours)       Procedure Component Value Units Date/Time    Glucose Tolerance, 2 Hours [[id number]] Collected: 06/22/25 0705    Specimen: Blood Updated: 06/22/25 0740    Narrative:      The following orders were created for panel order Glucose Tolerance, 2 Hours.  Procedure                               Abnormality         Status                     ---------                               -----------         ------                     GTT Fasting[[id number]]                                      In process                 GTT 1 Hour[[id number]]                                                                  GTT 2 Hour[[id number]]                                                                    Please view results for these tests on the individual orders.    GTT Fasting [[id number]] Collected: 06/22/25 0705    Specimen: Blood Updated: 06/22/25 0740    POC Glucose Once [[id number]]  (Normal) Collected: 06/22/25 0706    Specimen: Blood Updated: 06/22/25 0717     Glucose 129 mg/dL      Comment: : [id number] [employer] ID: [id number]       POC Glucose Once [[id number]]  (Abnormal) Collected: 06/21/25 2111    Specimen: Blood Updated: 06/21/25 2122     Glucose 150 mg/dL      Comment: : [id number] [employer] HeatherMeter ID: [id number]       POC Glucose Once [[id number]]  (Abnormal) Collected: 06/21/25 1353    Specimen: Blood Updated: 06/21/25 1404     Glucose 132 mg/dL      Comment: : [id number] [employer] ID: [id number]       POC Glucose Once [[id number]]  (Abnormal) Collected: 06/21/25 0926    Specimen: Blood Updated: 06/21/25 0937     Glucose 137 mg/dL      Comment: : [id number] [employer] Sydney[employer]eter ID: [id number]       CBC & Differential [[id number]]  (Abnormal) Collected: 06/21/25 0757    Specimen: Blood Updated:  06/21/25 0835    Narrative:      The following orders were created for panel order CBC & Differential.  Procedure                               Abnormality         Status                     ---------                               -----------         ------                     CBC Auto Differential[[id number]]        Abnormal            Final result                 Please view results for these tests on the individual orders.    CBC Auto Differential [[id number]]  (Abnormal) Collected: 06/21/25 0757    Specimen: Blood Updated: 06/21/25 0835     WBC 10.87 10*3/mm3      RBC 3.35 10*6/mm3      Hemoglobin 9.4 g/dL      Hematocrit 28.2 %      MCV 84.2 fL      MCH 28.1 pg      MCHC 33.3 g/dL      RDW 14.1 %      RDW-SD 42.9 fl      MPV 11.4 fL      Platelets 184 10*3/mm3      Neutrophil % 73.9 %      Lymphocyte % 17.1 %      Monocyte % 6.3 %      Eosinophil % 1.5 %      Basophil % 0.4 %      Immature Grans % 0.8 %      Neutrophils, Absolute 8.03 10*3/mm3      Lymphocytes, Absolute 1.86 10*3/mm3      Monocytes, Absolute 0.69 10*3/mm3      Eosinophils, Absolute 0.16 10*3/mm3      Basophils, Absolute 0.04 10*3/mm3      Immature Grans, Absolute 0.09 10*3/mm3      nRBC 0.0 /100 WBC             Prenatal Labs  Lab Results   Component Value Date    HGB 9.4 (L) 06/21/2025    RUBELLAABIGG 1.61 11/04/2024    HEPBSAG Negative 11/04/2024    ABSCRN Negative 06/20/2025    YVM8ILW4 Non Reactive 11/04/2024     (H) 12/30/2024    URINECX Final report 11/04/2024    CHLAMNAA Negative 11/04/2024    NGONORRHON Negative 11/04/2024       Immunizations:   Immunization History   Administered Date(s) Administered    Tdap 09/24/2021     Lab Results (last 24 hours)       Procedure Component Value Units Date/Time    Glucose Tolerance, 2 Hours [[id number]] Collected: 06/22/25 0705    Specimen: Blood Updated: 06/22/25 0740    Narrative:      The following orders were created for panel order Glucose Tolerance, 2 Hours.  Procedure                                Abnormality         Status                     ---------                               -----------         ------                     GTT Fasting[[id number]]                                      In process                 GTT 1 Hour[[id number]]                                                                  GTT 2 Hour[[id number]]                                                                    Please view results for these tests on the individual orders.    GTT Fasting [[id number]] Collected: 06/22/25 0705    Specimen: Blood Updated: 06/22/25 0740    POC Glucose Once [[id number]]  (Normal) Collected: 06/22/25 0706    Specimen: Blood Updated: 06/22/25 0717     Glucose 129 mg/dL      Comment: : [id number] [employer] SydneyMeter ID: [id number]       POC Glucose Once [[id number]]  (Abnormal) Collected: 06/21/25 2111    Specimen: Blood Updated: 06/21/25 2122     Glucose 150 mg/dL      Comment: : [id number] [employer] HeatherMeter ID: [id number]       POC Glucose Once [[id number]]  (Abnormal) Collected: 06/21/25 1353    Specimen: Blood Updated: 06/21/25 1404     Glucose 132 mg/dL      Comment: : [id number] [employer] SydneyMeter ID: [id number]       POC Glucose Once [[id number]]  (Abnormal) Collected: 06/21/25 0926    Specimen: Blood Updated: 06/21/25 0937     Glucose 137 mg/dL      Comment: : [id number] NUVETAMeter ID: [id number]       CBC & Differential [[id number]]  (Abnormal) Collected: 06/21/25 0757    Specimen: Blood Updated: 06/21/25 0835    Narrative:      The following orders were created for panel order CBC & Differential.  Procedure                               Abnormality         Status                     ---------                               -----------         ------                     CBC Auto Differential[[id number]]        Abnormal            Final result                 Please view results for these tests on the individual orders.    CBC Auto Differential [[id number]]  (Abnormal)  Collected: 25 0757    Specimen: Blood Updated: 25 0835     WBC 10.87 10*3/mm3      RBC 3.35 10*6/mm3      Hemoglobin 9.4 g/dL      Hematocrit 28.2 %      MCV 84.2 fL      MCH 28.1 pg      MCHC 33.3 g/dL      RDW 14.1 %      RDW-SD 42.9 fl      MPV 11.4 fL      Platelets 184 10*3/mm3      Neutrophil % 73.9 %      Lymphocyte % 17.1 %      Monocyte % 6.3 %      Eosinophil % 1.5 %      Basophil % 0.4 %      Immature Grans % 0.8 %      Neutrophils, Absolute 8.03 10*3/mm3      Lymphocytes, Absolute 1.86 10*3/mm3      Monocytes, Absolute 0.69 10*3/mm3      Eosinophils, Absolute 0.16 10*3/mm3      Basophils, Absolute 0.04 10*3/mm3      Immature Grans, Absolute 0.09 10*3/mm3      nRBC 0.0 /100 WBC             CBC          3/31/2025    08:48 2025    05:55 2025    10:30 2025    07:57   CBC   WBC  9.59  9.77  10.87    RBC  4.43  4.22  3.35    Hemoglobin 13.1  12.4  11.9  9.4    Hematocrit  36.9  35.4  28.2    MCV  83.3  83.9  84.2    MCH  28.0  28.2  28.1    MCHC  33.6  33.6  33.3    RDW  14.1  14.3  14.1    Platelets  182  199  184        Postpartum Depression: High Risk (2025)    [place]  Depression Scale     Last EPDS Total Score: 10     Last EPDS Self Harm Result: Never      Latest Reference Range & Units 25 09:26 25 13:53 25 21:11 25 07:06   Glucose 70 - 130 mg/dL 137 (H) 132 (H) 150 (H) 129   (H): Data is abnormally high         Assessment & Plan        Previous  delivery, antepartum    Previous  section    PPH (postpartum hemorrhage)    39 weeks gestation of pregnancy    Insulin controlled gestational diabetes mellitus (GDM) in third trimester        Assessment:    GUY Valentin is Day 2  post-partum  , Low Transverse  .       Plan:    continue post op care.  Fasting - elevated this morning. Restart lantus.   Monitor today. Discussed possible discharge later today vs tomorrow.   Follow-up 1 week outpatient.       Ez Richmond,  MD  6/22/2025  08:06 CDT

## 2025-06-22 NOTE — CASE MANAGEMENT/SOCIAL WORK
Order: EPDS of 10. Pt seemed confused on why she would have scored this way. She is saying she is doing fine at present time. She lives with spouse which is supportive, has 2 other children ages 3/7.  She was encouraged to reach out to physician if she does become depressed.

## 2025-06-22 NOTE — PLAN OF CARE
Goal Outcome Evaluation:  Plan of Care Reviewed With: patient        Progress: improving  Outcome Evaluation: VSS, FFML UU/U1, scant rubra, ALT with steri strips, voiding, ambulating, passing gas, ERAS for pain this shift

## 2025-06-22 NOTE — PLAN OF CARE
Goal Outcome Evaluation:  Plan of Care Reviewed With: patient, spouse        Progress: improving  Outcome Evaluation: vss, ffmlu1/u2, scant lochia, ltv incision WNL, voiding and ambulating, ERAS, bf well

## 2025-06-23 VITALS
DIASTOLIC BLOOD PRESSURE: 75 MMHG | RESPIRATION RATE: 18 BRPM | TEMPERATURE: 98.2 F | HEIGHT: 69 IN | OXYGEN SATURATION: 99 % | HEART RATE: 88 BPM | BODY MASS INDEX: 43.4 KG/M2 | WEIGHT: 293 LBS | SYSTOLIC BLOOD PRESSURE: 137 MMHG

## 2025-06-23 LAB
CREAT SERPL-MCNC: 0.57 MG/DL (ref 0.57–1)
EGFRCR SERPLBLD CKD-EPI 2021: 127.1 ML/MIN/1.73
GLUCOSE BLDC GLUCOMTR-MCNC: 84 MG/DL (ref 70–130)

## 2025-06-23 PROCEDURE — 82948 REAGENT STRIP/BLOOD GLUCOSE: CPT

## 2025-06-23 PROCEDURE — 0503F POSTPARTUM CARE VISIT: CPT | Performed by: OBSTETRICS & GYNECOLOGY

## 2025-06-23 PROCEDURE — 82565 ASSAY OF CREATININE: CPT | Performed by: OBSTETRICS & GYNECOLOGY

## 2025-06-23 RX ORDER — METFORMIN HYDROCHLORIDE 500 MG/1
500 TABLET, EXTENDED RELEASE ORAL
Qty: 30 TABLET | Refills: 2 | Status: SHIPPED | OUTPATIENT
Start: 2025-06-23

## 2025-06-23 RX ORDER — OXYCODONE AND ACETAMINOPHEN 5; 325 MG/1; MG/1
1 TABLET ORAL EVERY 6 HOURS PRN
Qty: 12 TABLET | Refills: 0 | Status: SHIPPED | OUTPATIENT
Start: 2025-06-23 | End: 2025-06-30

## 2025-06-23 RX ADMIN — PRENATAL VIT W/ FE FUMARATE-FA TAB 27-0.8 MG 1 TABLET: 27-0.8 TAB at 08:09

## 2025-06-23 RX ADMIN — ACETAMINOPHEN 650 MG: 325 TABLET ORAL at 08:10

## 2025-06-23 RX ADMIN — ACETAMINOPHEN 650 MG: 325 TABLET ORAL at 01:43

## 2025-06-23 RX ADMIN — OXYCODONE HYDROCHLORIDE AND ACETAMINOPHEN 500 MG: 500 TABLET ORAL at 08:09

## 2025-06-23 RX ADMIN — FERROUS SULFATE TAB 325 MG (65 MG ELEMENTAL FE) 325 MG: 325 (65 FE) TAB at 08:09

## 2025-06-23 RX ADMIN — IBUPROFEN 600 MG: 600 TABLET ORAL at 04:45

## 2025-06-23 NOTE — LACTATION NOTE
Mother's Name: Mena  Phone #: [phone]  Infant Name: unsure   : 2025 @ 0806  Gestation: 39w0d  Day of life: 3 days  Birth weight:  10-12 (4876 g) LGA  Discharge weight: 9-10 (4365 g)  Weight Loss: -10.48%  24 hour Summary of Feeds: 10 BF 20 ml EBM Voids: 6 Stools: 4  Assistive devices (shields, shells, etc):  Significant Maternal history: , BF 1st child 3 months, 2nd child 2 years, GDM   Maternal Concerns:    Maternal Goal: atleast 1 year  Mother's Medications: Lantus and PNV  Breastpump for home: yes, motif duo twist   Ped follow up appt: Manpreet     Visited with mother, infant with 10% weight loss. Mother feels that she is over feeding infant and really does not want to pump in addition to feedings. With permission, infant due to feed now, transfer evaluation conducted. Mother latched infant well on right breast in football hold using boppy pillow. Encouraged mother to hold infant behind shoulders and to help infant's chin and cheeks to touch breast to achieve deeper latch, as well as gently compressing breast during feeding to promote transfer. Mother did this and infant noted to be gulping at breast. Much praise provided! Educated mother on swallow triggers behind ear and under chin, and if needed to have FOB rub infant's ribs to help stay active at the breast. Mother verbalized understanding. Infant self released after 13 minutes of feeding. Weight obtained, 15 ml transferred. Mother burped infant prior to latching onto left breast in football hold using boppy pillow, mother stated that her left breast felt very full. Mild engorgement noted in outer quadrants. After a couple of minutes of sucking consistently infant noted to be gulping at the breast. Infant fed well with mild stimulation for 12 minutes. After infant self released weight was obtained, infant transferred additional 45 mls! Much praise provided to mother for very good feeding, as average feeding for infant is 15-30 ml today per  feeding. Instructed mother to continue feeding every 3 hours and to stimulate infant as needed at the breast to stay active, to continue compressing breast during feeding as well. Educated mother that as long as infant is feeding well at the breast then she does not need to supplement, to pump for comfort if needed after feedings. Mother verbalized understanding. Dr. Case present during infant's feeding and was updated post feeding, agreeable with plan and would like infant to follow up in 2 days with lactation only for weight check and transfer evaluation. Breastfeeding After Discharge education provided to mother. Educated on lactation appointment, instructed to bring infant ready for a feeding appointment made for June 25th at 1 pm at [facility].  Office number provided, encouraged to call with questions or concerns.      Today's Weight:  9-11.2 (4400 g)   Wt Loss:  -9.8%    Pre Weight: 9-12.2 (4430 g)           Left Breast:     12 min   (4490 g) +45 ml               Right Breast:   13 min   (4445 g) +15 ml                       Total Minutes:     25        Total Weight Gain:   60       gms or 2 oz!!! WOW!     Post feeding weight: 9-14.4 (4490 g)    Average Feeding Amount for Age: 15-30 ml or 1/2-1 oz every 2-3 hours or 8-12 times per day.    Signs of Milk: Fullness, firmness, heaviness of breasts, leaking of milk.  Signs of Good Feed: Breast fullness prior to feed, breasts soft and comfortable after feeding. Infant content after feeding: calm, sleepy, relaxed and without continued hunger cues.  Signs of Plugged Ducts, Engorgement and Mastitis: Plugged ducts (milk entrapment in milk ducts)- small tender knots that often feel like little beans under breast tissue, usually tender. Massage on these areas of concern while breastfeeding or pumping to promote emptying.   Engorgement- fluid or excess milk, breasts become uncomfortably full, tight, firm (compare to the firmness of your cheek (mild), chin  (moderate) or forehead (severe). First line of treatment should be to BREASTFEED, if breasts remain full feeling after a feeding, it may be necessary to pump, ONLY UNTIL BREASTS ARE SOFT AND COMFORTABLE. DO NOT OVER PUMP (complete emptying of breasts can trigger even more milk which will cause continued, recurrent Engorgement).  Mastitis- Infection of the breast tissue, most often caused by plugged ducts that are not adequately treated by emptying, recurrent trauma to nipples or breasts (cracked or bleeding nipples). Signs: redness, swelling, tender knots or fever to breasts as well as generalized fever >101 degrees F that is often sudden onset. Treatment of mastitis, BREASTFEED! Pump after breastfeeding to achieve COMPLETE emptying of affected breast, utilizing massage to areas of concern, may use cold compress to affected area only after breast emptying. May take anti-inflammatories i.e. Ibuprofen, Motrin. CALL your OB for assessment and continued treatment with Antibiotics to adequately treat mastitis.  Infant Care: Over the first 2 weeks it is important to keep record of infant's feeding routine (feeding times and durations), wet and dirty diaper frequency, stool color and any spit ups that may occur.  Keep in mind, ALL babies will lose some weight initially (usually no more than 10% by day 3). Until infant returns to/ surpasses birth weight (which can take up to 2 weeks), it is important to offer feedings AT LEAST EVERY 3 HOURS. Remember, if you choose to supplement infant with formula or previously pumped milk, you should always pump in replacement of that feeding in order to promote and maintain a healthy milk supply!  Maternal Care: REST, sleep when the infant sleeps, stay hydrated (water is optimal) drink to thirst, increase caloric intake - breastfeeding mother's need an ADDITIONAL 500 calories per day , eat 3 meals/day as well as snacks in between, limit CAFFIENE intake to 2 cups/day. Ask your significant  other, family members or friends for help when needed, taking advantage of meal trains, allowing others to help with laundry, house chores, etc can help you focus on what is most important early on after delivery… you and your infant, and breastfeeding!   Medications to CONTINUE: Prenatal Vitamins are important to continue taking while breastfeeding. Fish oil, magnesium/calcium supplements often are helpful to support Mothers and their milk supply as well. Tylenol, Ibuprofen, regular Zyrtec, Claritin are SAFE if you suffer from seasonal allergies. Flonase is safe and often an effective medication to take if suffering from sinus drainage/pressure.  Medications to AVOID: Benadryl, Sudafed, any medications including “DM” or have a drying effect to sinus drainage will also dry a mother's milk up. Birth control- your OB will want to address birth control options with you usually around 4-6 weeks postpartum, be sure to notify your MD if you continue to breastfeed as some birth controls may significantly decrease your milk supply. Herbals- some herbs may also decrease your milk supply: PEPPERMINT, MENTHOL in any form (candies, essential oils, teas, etc), so check labels and avoid using in excess.  Pumping: Although we encourage you to focus on breastfeeding over the first 2-4 weeks, you will need to plan to begin pumping. We do recommend implementing pumping by the time infant is 4 weeks old. Pump 2-3 times per day immediately AFTER breastfeeding, it is normal to collect very small amounts initially, but the more consistently you pump, the more you will begin to collect. Store collected milk in refrigerator or freezer. You should also begin offering infant a bottle around 4 weeks. Remember to use slow flow nipples and PACE the bottle-feed. A bottle feed should take about as long as a breastfeeding session.

## 2025-06-23 NOTE — DISCHARGE SUMMARY
[facility]    Britt Huntley MD  [address]  [place] [address]  [phone]      Discharge Summary      GUY Valentin  : 1995  MRN: [id number]  CSN: [id number]    Date of Admission: 2025   Date of Discharge:  2025   Delivering Physician: Raffaele Parra       Admission Diagnosis: Previous  delivery, antepartum [O34.219]  Previous  section [Z98.891]  GDM  Undesired fertility     Discharge Diagnosis: Pregnancy at 39w0d - delivered  2.   Postpartum hemorrhage       Procedures: 2025 - , Low Transverse with bilateral tubal ligation      Presenting Problem/History of Present Illness  Active Hospital Problems    Diagnosis  POA    **Previous  delivery, antepartum [O34.219]  Unknown    PPH (postpartum hemorrhage) [O72.1]  Unknown    39 weeks gestation of pregnancy [Z3A.39]  Not Applicable    Insulin controlled gestational diabetes mellitus (GDM) in third trimester [O24.414]  Unknown    Previous  section [Z98.891]  Not Applicable      Resolved Hospital Problems   No resolved problems to display.        Hospital Course  Patient is a 29 y.o.  who at 39w0d had a  section with bilateral tubal ligation. See the completed operative report for details regarding antepartum course and delivery. Pt with pp hemorrhage/atony requiring the DELISA however pt responded well to the DELISA and bleeding has been scant since delivery. Her post-operative course was unremarkable.  On POD # 3 she felt like she ready for discharge.  She was evaluated by Dr. Huntley who agreed she was able to be discharged to home.  She had no febrile morbidity. She had normal bowel and bladder function and was hemodynamically stable.  Her wound was healing well without obvious signs of infections.  Pt with impaired glucose with 2 hour GTT.  Pt given 20 units of lantus last night, however I feel we can switch patient to oral meds.   "    Infant  female fetus weighing 4876 g (10 lb 12 oz)  Apgars -  8 @ 1 minute /  8 @ 5 minutes.    Procedures Performed    Procedure(s):   SECTION REPEAT WITH TUBAL  -------------------       Consults:   Consults       No orders found from 2025 to 2025.            Pertinent Test Results:   CBC          3/31/2025    08:48 2025    05:55 2025    10:30 2025    07:57   CBC   WBC  9.59  9.77  10.87    RBC  4.43  4.22  3.35    Hemoglobin 13.1  12.4  11.9  9.4    Hematocrit  36.9  35.4  28.2    MCV  83.3  83.9  84.2    MCH  28.0  28.2  28.1    MCHC  33.6  33.6  33.3    RDW  14.1  14.3  14.1    Platelets  182  199  184        Condition on Discharge:  Stable    Vital Signs  Temp:  [97.9 °F (36.6 °C)] 97.9 °F (36.6 °C)  Heart Rate:  [] 94  Resp:  [18-20] 18  BP: (129-145)/(66-80) 131/66    Physical Exam:   No exam performed today,    Discharge Disposition      Discharge Medications     Discharge Medications        Continue These Medications        Instructions Start Date   B-D UF III MINI PEN NEEDLES 31G X 5 MM misc  Generic drug: Insulin Pen Needle   INJECT 1 EACH INTO THE SKIN EVERY EVENING.      SURE COMFORT INS SYR 1CC/29G 29G X 1/2\" 1 ML misc  Generic drug: Insulin Syringe              ASK your doctor about these medications        Instructions Start Date   Lantus SoloStar 100 UNIT/ML injection pen  Generic drug: Insulin Glargine   80 Units, Daily      prenatal (CLASSIC) vitamin 28-0.8 MG tablet tablet  Generic drug: prenatal vitamin   1 tablet, Daily      RELION GLUCOSE TEST STRIPS test strip  Generic drug: glucose blood   1 each, 4 Times Daily               Discharge Diet:  Home diet    Activity at Discharge: , pelvic rest, no lifting greater than baby's weight    Follow-up Appointments  Future Appointments   Date Time Provider Department Center   2025  2:30 PM Raffaele Parra MD [facility]   2025 11:15 AM Raffaele Parra MD [facility]       Follow-up " for postpartum visit/ incision check in 1-2 weeks with Dr. Parra.    Test Results Pending at Discharge  Pending Labs       Order Current Status    Tissue Pathology Exam Collected (25)    Tissue Pathology Exam Collected (25)    Creatinine Serum (Kidney Function) GFR Component In process             Britt Huntley MD  25  08:03 CDT    Time: Discharge <30 min

## 2025-06-24 ENCOUNTER — TELEPHONE (OUTPATIENT)
Dept: OBSTETRICS AND GYNECOLOGY | Age: 30
End: 2025-06-24

## 2025-06-24 ENCOUNTER — MATERNAL SCREENING (OUTPATIENT)
Dept: CALL CENTER | Facility: HOSPITAL | Age: 30
End: 2025-06-24
Payer: COMMERCIAL

## 2025-06-24 LAB
CYTO UR: NORMAL
CYTO UR: NORMAL
LAB AP CASE REPORT: NORMAL
LAB AP CASE REPORT: NORMAL
Lab: NORMAL
Lab: NORMAL
PATH REPORT.FINAL DX SPEC: NORMAL
PATH REPORT.FINAL DX SPEC: NORMAL
PATH REPORT.GROSS SPEC: NORMAL
PATH REPORT.GROSS SPEC: NORMAL

## 2025-06-24 NOTE — OUTREACH NOTE
Maternal Screening Survey      Flowsheet Row Responses   Eligibility Eligible   Prep survey completed? Yes   Facility patient discharged from? [facility]

## 2025-06-24 NOTE — TELEPHONE ENCOUNTER
Caller: GUY Valentin    Relationship: Self    Best call back number: [phone]     What form or medical record are you requesting: AMEND AND RESEND [employer] PAPERWORK     Who is requesting this form or medical record from you: EMPLOYER    How would you like to receive the form or medical records (pick-up, mail, fax):   If fax, what is the fax number: [phone]     Timeframe paperwork needed: ASAP    Additional notes:   PT CALLED IN REGARDS TO [facility] PAPERS - SCANNED IN ON 4/18/25  PT WOULD LIKE TO REQUEST THE LEAVE DATE TO BE UPDATED FROM 6/20/25 TO 6/18/25 AND REFAXED TO [facility]     PLEASE CALL PT IF YOU HAVE ANY QUESTIONS    THANK YOU

## 2025-06-26 ENCOUNTER — TELEPHONE (OUTPATIENT)
Dept: LACTATION | Facility: HOSPITAL | Age: 30
End: 2025-06-26
Payer: COMMERCIAL

## 2025-06-26 NOTE — TELEPHONE ENCOUNTER
Mother:  Mena Valentin      Attempted to reach pt as infant was at significant wt loss at [place] and Provider had order OP Lactation services.  Pt's name had been removed from schedule without any lactation staff having rec'd call from pt, nor was there any msg per dept voice mail from pt canceling appmt. (Pt may have canceled through [employer].)      No answer. Left msg informing pt that OP Lactation had been ordered due to wt loss, and that same could be rescheduled. Requested return call with update on bfing status. Dept number given.

## 2025-06-30 ENCOUNTER — POSTPARTUM VISIT (OUTPATIENT)
Age: 30
End: 2025-06-30
Payer: COMMERCIAL

## 2025-06-30 VITALS
BODY MASS INDEX: 40.29 KG/M2 | HEIGHT: 69 IN | DIASTOLIC BLOOD PRESSURE: 88 MMHG | WEIGHT: 272 LBS | SYSTOLIC BLOOD PRESSURE: 112 MMHG

## 2025-06-30 DIAGNOSIS — Z09 POSTOP CHECK: Primary | ICD-10-CM

## 2025-06-30 PROCEDURE — 99024 POSTOP FOLLOW-UP VISIT: CPT | Performed by: OBSTETRICS & GYNECOLOGY

## 2025-06-30 NOTE — PROGRESS NOTES
"GUY Valentin is a 29 y.o. female here today for incision check after undergoing  on .  She has been doing well since her discharge from the hospital and denies fevers, significant abdominal pain, nausea, or problems with her incision.  Her infant is breast-feeding well and she denies postpartum blues.    /88 (BP Location: Left arm, Patient Position: Sitting)   Ht 175.3 cm (69\")   Wt 123 kg (272 lb)   Breastfeeding Yes   BMI 40.17 kg/m²   In general pleasant female no acute distress  Abdomen soft and nontender  Her surgical taping is removed and her incision is healing well without signs of infection    Assessment: Normal incision check after a     She will continue with light activities and pelvic rest.  She will followup in 4 weeks for a postpartum visit and call in the meantime if she has any questions or concerns.   "

## 2025-07-02 ENCOUNTER — MATERNAL SCREENING (OUTPATIENT)
Dept: CALL CENTER | Facility: HOSPITAL | Age: 30
End: 2025-07-02
Payer: COMMERCIAL

## 2025-07-02 NOTE — OUTREACH NOTE
Maternal Screening Survey      Flowsheet Row Responses   Facility patient discharged from? [facility]   Attempt successful? No   Unsuccessful attempts Attempt 2              DAYAMI HUANG - Registered Nurse

## 2025-07-02 NOTE — OUTREACH NOTE
Maternal Screening Survey      Flowsheet Row Responses   Facility patient discharged from? [facility]   Attempt successful? No   Unsuccessful attempts Attempt 1              DAYAIM HUANG - Registered Nurse

## 2025-07-03 ENCOUNTER — MATERNAL SCREENING (OUTPATIENT)
Dept: CALL CENTER | Facility: HOSPITAL | Age: 30
End: 2025-07-03
Payer: COMMERCIAL

## 2025-07-03 NOTE — OUTREACH NOTE
Maternal Screening Survey      Flowsheet Row Responses   Facility patient discharged from? [facility]   Attempt successful? No   Unsuccessful attempts Attempt 3   Revoke Unable to reach              Amina FRANCO - Registered Nurse

## 2025-08-29 ENCOUNTER — TELEPHONE (OUTPATIENT)
Dept: OBSTETRICS AND GYNECOLOGY | Age: 30
End: 2025-08-29
Payer: COMMERCIAL

## (undated) DEVICE — SUT VIC RAPD SZ 3/0 27IN PS1 VR935

## (undated) DEVICE — SUT VIC RAPD SZ 2/0 36IN CT1 VR945 BX/12

## (undated) DEVICE — CVR HNDL LIGHT RIGID

## (undated) DEVICE — PENCL SMOKE/EVAC MEGADYNE TELESCP 10FT

## (undated) DEVICE — SUT VIC 0 CTX 36IN J978H

## (undated) DEVICE — SUT MNCRYL 0/0 CTX 36IN Y398H

## (undated) DEVICE — LARGE, DISPOSABLE C-SECTION RETRACTOR: Brand: ALEXIS ® O C-SECTION PROTECTOR/RETRACTOR

## (undated) DEVICE — SUT GUT PLN 0 CT3 27IN N864H

## (undated) DEVICE — SUT GUT PLAIN TPR PT 2/0 27IN 53T

## (undated) DEVICE — Device

## (undated) DEVICE — ADHS LIQ MASTISOL 2/3ML

## (undated) DEVICE — GLOVE,SURG,SENSICARE SLT,LF,PF,8: Brand: MEDLINE

## (undated) DEVICE — PAD C-SECTION: Brand: MEDLINE INDUSTRIES, INC.

## (undated) DEVICE — PATIENT RETURN ELECTRODE, SINGLE-USE, CONTACT QUALITY MONITORING, ADULT, WITH 15 FT (4.5 M) CORD. FOR PATIENTS WEIGHING OVER 33LBS. (15KG): Brand: MEGADYNE

## (undated) DEVICE — 3M™ STERI-STRIP™ REINFORCED ADHESIVE SKIN CLOSURES, R1547, 1/2 IN X 4 IN (12 MM X 100 MM), 6 STRIPS/ENVELOPE: Brand: 3M™ STERI-STRIP™

## (undated) DEVICE — APPL CHLORAPREP HI/LITE 26ML ORNG

## (undated) DEVICE — 3M™ MEDIPORE™ H SOFT CLOTH SURGICAL TAPE 2868, 8 INCH X 10 YARD (20,3CM X 9,1M), 6 ROLLS/CASE: Brand: 3M™ MEDIPORE™

## (undated) DEVICE — SPNG GZ WOVN 4X4IN 12PLY 10/BX STRL